# Patient Record
Sex: MALE | Race: WHITE | NOT HISPANIC OR LATINO | URBAN - METROPOLITAN AREA
[De-identification: names, ages, dates, MRNs, and addresses within clinical notes are randomized per-mention and may not be internally consistent; named-entity substitution may affect disease eponyms.]

---

## 2021-02-24 RX ORDER — LAMOTRIGINE 200 MG/1
200 TABLET ORAL DAILY
COMMUNITY

## 2021-02-24 RX ORDER — ATORVASTATIN CALCIUM 40 MG/1
40 TABLET, FILM COATED ORAL EVERY MORNING
COMMUNITY

## 2021-02-24 RX ORDER — BUPROPION HYDROCHLORIDE 100 MG/1
100 TABLET, EXTENDED RELEASE ORAL 2 TIMES DAILY
Status: ON HOLD | COMMUNITY
End: 2021-03-02 | Stop reason: ENTERED-IN-ERROR

## 2021-02-24 RX ORDER — QUETIAPINE FUMARATE 100 MG/1
100 TABLET, FILM COATED ORAL 2 TIMES DAILY
COMMUNITY
End: 2021-03-18 | Stop reason: ENTERED-IN-ERROR

## 2021-02-24 RX ORDER — PANTOPRAZOLE SODIUM 40 MG/1
40 TABLET, DELAYED RELEASE ORAL EVERY MORNING
COMMUNITY

## 2021-02-24 RX ORDER — CLONAZEPAM 0.5 MG/1
0.5 TABLET ORAL 2 TIMES WEEKLY
COMMUNITY

## 2021-02-24 RX ORDER — EMTRICITABINE AND TENOFOVIR ALAFENAMIDE 200; 25 MG/1; MG/1
1 TABLET ORAL EVERY MORNING
COMMUNITY

## 2021-02-24 RX ORDER — MORPHINE SULFATE 15 MG/1
15 TABLET ORAL 2 TIMES DAILY PRN
COMMUNITY
End: 2021-03-04 | Stop reason: HOSPADM

## 2021-02-24 SDOH — HEALTH STABILITY: MENTAL HEALTH: HOW OFTEN DO YOU HAVE A DRINK CONTAINING ALCOHOL?: 2-4 TIMES A MONTH

## 2021-02-24 ASSESSMENT — PAIN SCALES - GENERAL: PAINLEVEL: 6

## 2021-02-25 ENCOUNTER — TRANSCRIBE ORDERS (OUTPATIENT)
Dept: REGISTRATION | Facility: HOSPITAL | Age: 46
End: 2021-02-25

## 2021-02-25 ENCOUNTER — OFFICE VISIT (OUTPATIENT)
Dept: PREADMISSION TESTING | Facility: HOSPITAL | Age: 46
End: 2021-02-25
Attending: ORTHOPAEDIC SURGERY
Payer: COMMERCIAL

## 2021-02-25 ENCOUNTER — OFFICE VISIT (OUTPATIENT)
Dept: CARDIOLOGY | Facility: CLINIC | Age: 46
End: 2021-02-25
Payer: COMMERCIAL

## 2021-02-25 ENCOUNTER — APPOINTMENT (OUTPATIENT)
Dept: LAB | Facility: HOSPITAL | Age: 46
End: 2021-02-25
Attending: ORTHOPAEDIC SURGERY
Payer: COMMERCIAL

## 2021-02-25 VITALS
BODY MASS INDEX: 30.1 KG/M2 | OXYGEN SATURATION: 99 % | WEIGHT: 215 LBS | HEIGHT: 71 IN | HEART RATE: 85 BPM | SYSTOLIC BLOOD PRESSURE: 126 MMHG | DIASTOLIC BLOOD PRESSURE: 78 MMHG

## 2021-02-25 VITALS
OXYGEN SATURATION: 99 % | BODY MASS INDEX: 29.89 KG/M2 | WEIGHT: 213.5 LBS | SYSTOLIC BLOOD PRESSURE: 128 MMHG | RESPIRATION RATE: 18 BRPM | DIASTOLIC BLOOD PRESSURE: 81 MMHG | TEMPERATURE: 98.3 F | HEART RATE: 96 BPM | HEIGHT: 71 IN

## 2021-02-25 DIAGNOSIS — Z01.818 PREOP TESTING: Primary | ICD-10-CM

## 2021-02-25 DIAGNOSIS — G47.33 OSA (OBSTRUCTIVE SLEEP APNEA): ICD-10-CM

## 2021-02-25 DIAGNOSIS — Z71.89 COUNSELING ON HEALTH PROMOTION AND DISEASE PREVENTION: ICD-10-CM

## 2021-02-25 DIAGNOSIS — F31.9 BIPOLAR 1 DISORDER (CMS/HCC): ICD-10-CM

## 2021-02-25 DIAGNOSIS — I82.4Z9 DEEP VEIN THROMBOSIS (DVT) OF DISTAL VEIN OF LOWER EXTREMITY, UNSPECIFIED CHRONICITY, UNSPECIFIED LATERALITY (CMS/HCC): ICD-10-CM

## 2021-02-25 DIAGNOSIS — Z21 ASYMPTOMATIC HIV INFECTION (CMS/HCC): ICD-10-CM

## 2021-02-25 DIAGNOSIS — Z01.810 ENCOUNTER FOR PRE-OPERATIVE CARDIOVASCULAR CLEARANCE: ICD-10-CM

## 2021-02-25 DIAGNOSIS — K86.1 CHRONIC PANCREATITIS, UNSPECIFIED PANCREATITIS TYPE (CMS/HCC): ICD-10-CM

## 2021-02-25 DIAGNOSIS — M16.12 OSTEOARTHRITIS OF LEFT HIP, UNSPECIFIED OSTEOARTHRITIS TYPE: ICD-10-CM

## 2021-02-25 DIAGNOSIS — M16.12 UNILATERAL PRIMARY OSTEOARTHRITIS, LEFT HIP: ICD-10-CM

## 2021-02-25 DIAGNOSIS — I48.91 ATRIAL FIBRILLATION, UNSPECIFIED TYPE (CMS/HCC): Primary | ICD-10-CM

## 2021-02-25 DIAGNOSIS — M16.12 UNILATERAL PRIMARY OSTEOARTHRITIS, LEFT HIP: Primary | ICD-10-CM

## 2021-02-25 PROBLEM — M19.90 DJD (DEGENERATIVE JOINT DISEASE): Status: ACTIVE | Noted: 2021-02-25

## 2021-02-25 PROBLEM — I82.409 DVT (DEEP VENOUS THROMBOSIS) (CMS/HCC): Status: ACTIVE | Noted: 2021-02-25

## 2021-02-25 LAB
ABO + RH BLD: NORMAL
ANION GAP SERPL CALC-SCNC: 10 MEQ/L (ref 3–15)
APTT PPP: 28 SEC (ref 23–35)
BLD GP AB SCN SERPL QL: NEGATIVE
BUN SERPL-MCNC: 15 MG/DL (ref 8–20)
CALCIUM SERPL-MCNC: 9.5 MG/DL (ref 8.9–10.3)
CHLORIDE SERPL-SCNC: 103 MEQ/L (ref 98–109)
CO2 SERPL-SCNC: 26 MEQ/L (ref 22–32)
CREAT SERPL-MCNC: 1.1 MG/DL (ref 0.8–1.3)
D AG BLD QL: POSITIVE
ERYTHROCYTE [DISTWIDTH] IN BLOOD BY AUTOMATED COUNT: 14 % (ref 11.6–14.4)
GFR SERPL CREATININE-BSD FRML MDRD: >60 ML/MIN/1.73M*2
GLUCOSE SERPL-MCNC: 93 MG/DL (ref 70–99)
HCT VFR BLDCO AUTO: 43.7 % (ref 40.1–51)
HGB BLD-MCNC: 14.1 G/DL (ref 13.7–17.5)
INR PPP: 1.2
LABORATORY COMMENT REPORT: NORMAL
MCH RBC QN AUTO: 30.4 PG (ref 28–33.2)
MCHC RBC AUTO-ENTMCNC: 32.3 G/DL (ref 32.2–36.5)
MCV RBC AUTO: 94.2 FL (ref 83–98)
PDW BLD AUTO: 9.5 FL (ref 9.4–12.4)
PLATELET # BLD AUTO: 241 K/UL (ref 150–350)
POTASSIUM SERPL-SCNC: 3.9 MEQ/L (ref 3.6–5.1)
PROTHROMBIN TIME: 15 SEC (ref 12.2–14.5)
RBC # BLD AUTO: 4.64 M/UL (ref 4.5–5.8)
SODIUM SERPL-SCNC: 139 MEQ/L (ref 136–144)
WBC # BLD AUTO: 6.89 K/UL (ref 3.8–10.5)

## 2021-02-25 PROCEDURE — 99244 OFF/OP CNSLTJ NEW/EST MOD 40: CPT | Performed by: INTERNAL MEDICINE

## 2021-02-25 PROCEDURE — U0003 INFECTIOUS AGENT DETECTION BY NUCLEIC ACID (DNA OR RNA); SEVERE ACUTE RESPIRATORY SYNDROME CORONAVIRUS 2 (SARS-COV-2) (CORONAVIRUS DISEASE [COVID-19]), AMPLIFIED PROBE TECHNIQUE, MAKING USE OF HIGH THROUGHPUT TECHNOLOGIES AS DESCRIBED BY CMS-2020-01-R: HCPCS | Performed by: HOSPITALIST

## 2021-02-25 PROCEDURE — 36415 COLL VENOUS BLD VENIPUNCTURE: CPT | Performed by: HOSPITALIST

## 2021-02-25 PROCEDURE — 85730 THROMBOPLASTIN TIME PARTIAL: CPT | Mod: GA

## 2021-02-25 PROCEDURE — 85027 COMPLETE CBC AUTOMATED: CPT | Mod: GA

## 2021-02-25 PROCEDURE — 85610 PROTHROMBIN TIME: CPT | Mod: GA

## 2021-02-25 PROCEDURE — 86900 BLOOD TYPING SEROLOGIC ABO: CPT

## 2021-02-25 PROCEDURE — 99204 OFFICE O/P NEW MOD 45 MIN: CPT | Performed by: HOSPITALIST

## 2021-02-25 PROCEDURE — 80048 BASIC METABOLIC PNL TOTAL CA: CPT

## 2021-02-25 PROCEDURE — 93000 ELECTROCARDIOGRAM COMPLETE: CPT | Performed by: INTERNAL MEDICINE

## 2021-02-25 RX ORDER — FUROSEMIDE 40 MG/1
40 TABLET ORAL DAILY PRN
COMMUNITY
End: 2021-02-25 | Stop reason: ENTERED-IN-ERROR

## 2021-02-25 ASSESSMENT — ENCOUNTER SYMPTOMS
HEMATOCHEZIA: 0
DYSPNEA ON EXERTION: 0
IRREGULAR HEARTBEAT: 0
SYNCOPE: 0
LIGHT-HEADEDNESS: 0
NEAR-SYNCOPE: 0
BRUISES/BLEEDS EASILY: 1
PND: 0
PALPITATIONS: 0
HEMATEMESIS: 0
DIZZINESS: 0
DIAPHORESIS: 0
HEMATURIA: 0
SHORTNESS OF BREATH: 0
MYALGIAS: 0
ORTHOPNEA: 0
ALTERED MENTAL STATUS: 0

## 2021-02-25 ASSESSMENT — PAIN SCALES - GENERAL: PAINLEVEL: 5

## 2021-02-25 NOTE — ASSESSMENT & PLAN NOTE
There is no evidence of any active or ongoing cardiac condition at this time.  The proposed procedure in general carries an intermediate risk of cardiac complications.  The patient has intermediate clinical predictors of increased risk of periprocedural cardiac complications. There have been no recent episodes of angina, CHF, or clinical arrhythmias.  Patient is at acceptable risk for surgery from a cardiac standpoint.  Further testing or interventions are unlikely to improve the patient's risk. Routine perioperative care. His hematologist recommended to hold Eliquis for 3 days before surgery.  I discussed with patient and decided to hold it only for 2 days to minimize the time that he is not anticoagulated.  We will resume the routine medications when oral intake is tolerated post-operatively. DVT prophylaxis according to the surgical service's protocol. We will assist you with the management of any medical problems during the hospitalization.

## 2021-02-25 NOTE — PROGRESS NOTES
Pre-Operative   Consult Note         Reason for visit:   Chief Complaint   Patient presents with   • New Patient       HPI     Sam Fitzgerald Chevalier comes to the office today for preoperative cardiac evaluation prior to a left hip replacement with Dr. Pradeep Rick on 3/2/21.    He reports ongoing left hip pain that has progressively worsened, limiting his activity. His PMH is significant for DVT, Anemia, HIV and HARPER, He does not use CPAP since a weight loss of 150 lbs. His primary care is managed by Dr. Jenae Beck.    Today he reports feeling well, other than hip pain since 2017, but he is going to the gym a few days a week doing the ellipical for 30 minutes and lifting weights without cardiac complaints or limitations.     He reports that he can climb a flight of stairs and walk at least a city block in distance without chest discomfort or shortness of breath.    Patient denies any history of cardiac disease. Patient denies any chest pain, shortness of breath, dizziness or palpitations. Patient denies any PND or orthopnea, he can sleep almost flat at night without any cardiac symptoms.      Past Medical History:   Diagnosis Date   • Anemia     last transfusion    • Bipolar 1 disorder (CMS/HCC)    • Deep vein thrombosis (CMS/HCC)     last clot  PE   • Depression    • HIV disease (CMS/[facility])    • Pancreatitis    • Sleep apnea     lost 150lb  no cpap currently       Past Surgical History:   Procedure Laterality Date   • ABDOMINOPLASTY     • DENTAL SURGERY      extractions     • GASTRIC BYPASS         Social History     Tobacco Use   Smoking Status Former Smoker   • Packs/day: 1.00   • Years: 25.00   • Pack years: 25.00   • Quit date: 2021   • Years since quittin.1     Social History     Tobacco Use   • Smoking status: Former Smoker     Packs/day: 1.00     Years: 25.00     Pack years: 25.00     Quit date: 2021     Years since quittin.1   • Smokeless tobacco: Never  Used   Substance Use Topics   • Alcohol use: Yes     Frequency: 2-4 times a month   • Drug use: Not Currently     Types: Heroin, Codeine, IV     Comment: no longer uses heroin or cocaine         Family History   Adopted: Yes       Allergies:  Patient has no known allergies.    Current Outpatient Medications   Medication Sig Dispense Refill   • apixaban (ELIQUIS) 2.5 mg tablet Take 2.5 mg by mouth 2 (two) times a day.     • atorvastatin (LIPITOR) 40 mg tablet Take 40 mg by mouth every morning.     • buPROPion SR (WELLBUTRIN SR) 100 mg 12 hr tablet Take 100 mg by mouth 2 (two) times a day. 100mg in morning  75mg afternoon     • clonazePAM (klonoPIN) 0.5 mg tablet Take 0.5 mg by mouth 2 (two) times a week (Sun, Wed).     • dolutegravir (TIVICAY) 50 mg tablet Take 50 mg by mouth every morning.     • emtricitabine-tenofovir alafenamide (DESCOVY) 200-25 mg tablet tablet Take 1 tablet by mouth every morning.     • lamoTRIgine (LaMICtal) 25 mg tablet Take 200 mg by mouth daily. 1 tablet daily      • morphine (MSIR) 15 mg immediate release tablet Take 15 mg by mouth daily as needed.       • pantoprazole (PROTONIX) 40 mg EC tablet Take 40 mg by mouth every morning.     • QUEtiapine (SEROquel) 100 mg tablet Take 100 mg by mouth nightly.     • medical marijuana 1 each See admin instr. Please be advised that an [facility] hospital staff member has listed medical marijuana as one of your home medications for documentation purposes. Please follow-up with your certified issuing provider for ongoing use of medical marijuana.       No current facility-administered medications for this visit.        Review of Systems   Constitution: Negative for diaphoresis.   HENT: Negative for hearing loss.    Eyes: Negative for visual disturbance.   Cardiovascular: Positive for leg swelling. Negative for chest pain, dyspnea on exertion, irregular heartbeat, near-syncope, orthopnea, palpitations, paroxysmal nocturnal dyspnea and syncope.   Respiratory:  Negative for shortness of breath.    Hematologic/Lymphatic: Bruises/bleeds easily.   Skin: Negative for rash.   Musculoskeletal: Positive for arthritis and joint pain (left hip). Negative for myalgias.   Gastrointestinal: Negative for hematemesis, hematochezia and melena.   Genitourinary: Negative for hematuria.   Neurological: Negative for dizziness and light-headedness.   Psychiatric/Behavioral: Negative for altered mental status.       Objective     Vitals:    02/25/21 1231   BP:    Pulse: 85   SpO2:        Wt Readings from Last 1 Encounters:   02/25/21 96.8 kg (213 lb 8 oz)       Physical Exam   Constitutional: He appears well-developed.   HENT:   Head: Normocephalic and atraumatic.   Eyes: EOM are normal.   Neck: No JVD present.   Cardiovascular: Regular rhythm, S1 normal, S2 normal and intact distal pulses. Tachycardia present.   No murmur heard.  Pulses:       Carotid pulses are 2+ on the right side and 2+ on the left side.       Posterior tibial pulses are 2+ on the right side and 2+ on the left side.   Pulmonary/Chest: Effort normal and breath sounds normal.   Abdominal: Soft. Bowel sounds are normal.   Musculoskeletal:         General: No edema.   Neurological: He is alert.   Skin: Skin is warm.   Psychiatric: He has a normal mood and affect.   Vitals reviewed.    ECG   Sinus  Tachycardia   -  Nonspecific T-abnormality.     ABNORMAL     Labs   Lab Results   Component Value Date    WBC 6.89 02/25/2021    HGB 14.1 02/25/2021    HCT 43.7 02/25/2021     02/25/2021     02/25/2021    K 3.9 02/25/2021     02/25/2021    CREATININE 1.1 02/25/2021    BUN 15 02/25/2021    CO2 26 02/25/2021    PT 15.0 (H) 02/25/2021    GLUCOSE 93 02/25/2021         Surgical Risk Assessment  The proposed procedure is intermediate risk, corresponding to a 1-5% 30-day risk of a major adverse cardiac event.    The patient has the following risk factors from the Revised Cardiac Risk Index (RCRI): None.  With no risk  factors, this corresponds to a low risk (0.4%) of major adverse cardiac events.    The patient has a Duke Activity Status Index score of 34.7, corresponding to an expected exertional capacity of 7.01 METS.          Assessment/Plan     HARPER (obstructive sleep apnea)  Patient reports that he was diagnosed with sleep apnea but then he lost significant amount of weight and a repeat sleep study was negative.  To consider CO2 monitoring perioperatively.    Encounter for pre-operative cardiovascular clearance  There is no evidence of any active or ongoing cardiac condition at this time.  The proposed procedure in general carries an intermediate risk of cardiac complications.  The patient has intermediate clinical predictors of increased risk of periprocedural cardiac complications. There have been no recent episodes of angina, CHF, or clinical arrhythmias.  Patient is at acceptable risk for surgery from a cardiac standpoint.  Further testing or interventions are unlikely to improve the patient's risk. Routine perioperative care. His hematologist recommended to hold Eliquis for 3 days before surgery.  I discussed with patient and decided to hold it only for 2 days to minimize the time that he is not anticoagulated.  We will resume the routine medications when oral intake is tolerated post-operatively. DVT prophylaxis according to the surgical service's protocol. We will assist you with the management of any medical problems during the hospitalization.       DVT (deep venous thrombosis) (CMS/[facility])  He reports multiple DVTs but tells me that no DVT in the last 5 years.  He understands the high risk of thrombotic events with holding Eliquis for surgery and agrees.  He was seen by his hematologist recently and his hematologist is aware of him having this surgery.    Counseling on health promotion and disease prevention  We discussed about heart disease and cardiac risk factors.  I discussed with patient that HIV is a cardiac risk  enhancement factor.  Patient was extensively counseled regarding lifestyle changes and risk factor modifications. Patient understands that noncompliance with these recommendations may increase the risk of cardiovascular complications.  Follow-up with primary care physician to continue preventive measures for coronary artery disease, risk factor modification and lifestyle changes.  Recommend follow a cardiac diet and exercise as tolerated.       This note was completed in part utilizing a voice recognition software. Grammatical errors, random word insertion, spelling mistakes, and incomplete sentences may be an occasional consequence of the system secondary to software limitations, ambient noise and hardware issues.   Please read the chart carefully and recognize, using context, where substitutions have occurred. If you have any questions or concerns about the context, text or information contained within the body of this dictation, please contact myself, the provider, for further clarification.    I, Kristy Greenwood, am scribing for, and in the presence of, Clinton Schmidt MD.    IClinton MD, personally performed the services described in this documentation as scribed by Kristy Greenwood in my presence, and it is both accurate and complete.       Clinton Schmidt MD  2/25/2021

## 2021-02-25 NOTE — CONSULTS
[facility] Medicine Service -  Pre-Operative Consultation       Patient Name: Sam Fitzgerald Chevalier  Referring Surgeon: Dr. Rick    Reason for Referral: Pre-Operative Evaluation  Surgical Procedure: L THR  Operative Date: 3/2/21    PCP: Jenae Beck MD        HISTORY OF PRESENT ILLNESS      Sam Fitzgerald Chevalier is a 45 y.o. male presenting today to the [facility] at [place] for pre-operative evaluation prior to planned surgery.    Mr. Chevalier has been dealing with pain and decreased ROM of his left hip due to DJD. The symptoms have progressed and are interfering with his quality of life so he has decided to proceed with joint replacement.    Overall, the patient has been feeling in good health with the exception of his hip pain. He denies current or recent CP, SOB, palpitations, syncope, fever, cough, abd pain, vomiting, diarrhea, BRBPR, melena, hematemesis, dysuria, hematuria, headache, vision change, vertigo, numbness/tingling/focal weakness, light headedness, or additional complaint.       Functionally, the patient is able to ascend a flight of stairs with no dyspnea or chest pain. He works out at the gym several days per week.  He denies ever having any anginal symptoms.  Functional capacity is  > 4 METS.    The patient denies, on specific questioning, the following:  No history of MI, arrhythmia, valvular heart disease or CHF.  No history of COPD.  No history of CVA or TIA.  No history of DM or insulin use.   No history of CKD.   No history of liver disease or cirrhosis.  No history of bleeding disorder.  No history of difficult airway/difficult intubation.  No history of Malignant hyperthermia.  No history of adverse reaction to anesthesia in the past.      PAST MEDICAL AND SURGICAL HISTORY      Past Medical History:   Diagnosis Date   • Anemia     last transfusion 2017   • Bipolar 1 disorder (CMS/HCC)    • Deep vein thrombosis (CMS/HCC) 2009     last clot 2015 PE   • Depression    • HIV disease (CMS/[facility])    • Pancreatitis    • Sleep apnea 2004    lost 150lb  no cpap currently       Past Surgical History:   Procedure Laterality Date   • ABDOMINOPLASTY  2006   • DENTAL SURGERY  2020    extractions     • GASTRIC BYPASS  2004       MEDICATIONS        Current Outpatient Medications:   •  apixaban (ELIQUIS) 2.5 mg tablet, Take 2.5 mg by mouth 2 (two) times a day., Disp: , Rfl:   •  atorvastatin (LIPITOR) 40 mg tablet, Take 40 mg by mouth every morning., Disp: , Rfl:   •  buPROPion SR (WELLBUTRIN SR) 100 mg 12 hr tablet, Take 100 mg by mouth 2 (two) times a day. 100mg in morning 75mg afternoon, Disp: , Rfl:   •  clonazePAM (klonoPIN) 0.5 mg tablet, Take 0.5 mg by mouth 2 (two) times a week (Sun, Wed)., Disp: , Rfl:   •  dolutegravir (TIVICAY) 50 mg tablet, Take 50 mg by mouth every morning., Disp: , Rfl:   •  emtricitabine-tenofovir alafenamide (DESCOVY) 200-25 mg tablet tablet, Take 1 tablet by mouth every morning., Disp: , Rfl:   •  lamoTRIgine (LaMICtal) 25 mg tablet, Take 200 mg by mouth daily. 1 tablet daily , Disp: , Rfl:   •  medical marijuana, 1 each See admin instr. Please be advised that an [facility] hospital staff member has listed medical marijuana as one of your home medications for documentation purposes. Please follow-up with your certified issuing provider for ongoing use of medical marijuana., Disp: , Rfl:   •  morphine (MSIR) 15 mg immediate release tablet, Take 15 mg by mouth daily as needed.  , Disp: , Rfl:   •  pantoprazole (PROTONIX) 40 mg EC tablet, Take 40 mg by mouth every morning., Disp: , Rfl:   •  QUEtiapine (SEROquel) 100 mg tablet, Take 100 mg by mouth nightly., Disp: , Rfl:     ALLERGIES      Patient has no known allergies.    FAMILY HISTORY      family history is not on file. He was adopted.    SOCIAL HISTORY      Social History     Tobacco Use   • Smoking status: Former Smoker     Packs/day: 1.00     Years: 25.00     Pack years: 25.00     " Quit date: 2021     Years since quittin.1   • Smokeless tobacco: Never Used   Substance Use Topics   • Alcohol use: Yes     Frequency: 2-4 times a month   • Drug use: Not Currently     Types: Heroin, Codeine, IV     Comment: no longer uses heroin or cocaine         REVIEW OF SYSTEMS      All other systems reviewed and negative except as noted in HPI    PHYSICAL EXAMINATION      Visit Vitals  /81   Pulse 96   Temp 36.8 °C (98.3 °F) (Oral)   Resp 18   Ht 1.803 m (5' 11\")   Wt 96.8 kg (213 lb 8 oz)   SpO2 99%   BMI 29.78 kg/m²     Body mass index is 29.78 kg/m².    Physical Exam  General: nontoxic appearing, no acute distress  HEENT: Moist membranes, PERRL, anicteric sclera   Neck: supple, no JVD  Cardiac: RRR, +S1/S2, no murmur, no rub   Lungs: clear bilaterally, no wheezing/rales/rhonchi  Abdomen: soft, NT/ND, +BS, no rebound/guarding   Extremities: no edema, distal perfusion intact  MSK: no joint effusion or erythema  Neuro: AAOx3, nonfocal. CN's intact grossly. No focal motor deficit. No sensory deficit.  Skin: no rash. Clean, dry, intact.   Psych: cooperative    LABS / EKG        Labs  Today's labs reviewed.    Lab Results   Component Value Date     2021    K 3.9 2021     2021    BUN 15 2021    CREATININE 1.1 2021    WBC 6.89 2021    HGB 14.1 2021    HCT 43.7 2021     2021    INR 1.2 2021         ECG   Reviewed. Sinus 102. Nonspecific abnormality.    ASSESSMENT AND PLAN         DJD (degenerative joint disease)  L THR planned for 3/2/21.  See below for statement in regards to perioperative risk.   Defer recommendations on NSAID use to the patient's surgeon.  Recommend DVT prophylaxis at the discretion of the surgeon.   Incentive spirometry and early ambulation post op.  Perioperative antibiotics defer to surgery.  Post op bowel regimen.  If present, recommend removal of trevizo catheter as early as possible to prevent " infection.  Monitor CBC, BMP, and volume status in the perioperative period.      HARPER (obstructive sleep apnea)  The patient states he has not required CPAP since he lost significant weight.  Sleep apnea places the patient at relatively increased risk (compared to a population without this diagnosis) of oxygen desaturation, cardiac events, acute respiratory failure, or an ICU transfer.   In the post op period: recommend use of our sleep apnea protocol in the PACU, pulse ox monitoring, consider extubate to BIPAP.  Reduce narcotic medication dosage as much as possible.       DVT (deep venous thrombosis) (CMS/[facility])  History of DVT/PE in 2015.  On Eliquis.  The patient has not had a recurrent thrombosis since 2015.  He discussed his plan for surgery with his hematologist at [facility], and he plans on stopping Eliquis 2 days prior to surgery without Lovenox bridge. Risks/benefits of Lovenox bridge discussed with the patient and he prefers to just hold Eliquis without a bridge.  He is aware of the increased risk of thrombosis while his anticoagulation is held for surgery.  Recommend resuming Eliquis post op as soon as ok with the surgeon.    Bipolar 1 disorder (CMS/HCC)  The patient states this is controlled on his current regimen.  Continue home med regimen post op    Chronic pancreatitis (CMS/[facility])  History of chronic pain.   He takes MSIR once per day prn pain with eating.  Follows with pain management at [facility].  Multimodal post op pain management per the patient's inpatient medical team.   Post op bowel regimen.     Asymptomatic HIV infection (CMS/HCC)  CD4 958 and undetectable viral load on last month's lab work.  The patient will bring his Tivicay and Descovy with him for his upcoming hospitalization as these are likely not on formulary with the pharmacy.   Resume his HIV regimen post op when tolerating po intake.        In regards to perioperative cardiac risk:  The patient denies any history of ischemic heart  disease, denies any history of CHF, denies any history of CVA, is not on pre-operative treatment with insulin, and does not have a pre-operative creatinine > 2 mg/dL.   The Revised Cardiac Risk Index (RCRI) = 0 for this patient which indicates a 0.4% risk of major adverse cardiac event in the perioperative period for this intermediate risk procedure.   The patient has an appointment to see [facility] for preoperative assessment later today. Ultimately defer preoperative cardiovascular risk assessment and recommendations for further testing to the patient's cardiologist.            Please do not hesitate to contact [facility] during the upcoming hospitalization with any questions or concerns.     Grupo Coats, DO  2/26/2021

## 2021-02-25 NOTE — ASSESSMENT & PLAN NOTE
L THR planned for 3/2/21.  See below for statement in regards to perioperative risk.   Defer recommendations on NSAID use to the patient's surgeon.  Recommend DVT prophylaxis at the discretion of the surgeon.   Incentive spirometry and early ambulation post op.  Perioperative antibiotics defer to surgery.  Post op bowel regimen.  If present, recommend removal of trevizo catheter as early as possible to prevent infection.  Monitor CBC, BMP, and volume status in the perioperative period.

## 2021-02-25 NOTE — ASSESSMENT & PLAN NOTE
The patient states he has not required CPAP since he lost significant weight.  Sleep apnea places the patient at relatively increased risk (compared to a population without this diagnosis) of oxygen desaturation, cardiac events, acute respiratory failure, or an ICU transfer.   In the post op period: recommend use of our sleep apnea protocol in the PACU, pulse ox monitoring, consider extubate to BIPAP.  Reduce narcotic medication dosage as much as possible.

## 2021-02-25 NOTE — ASSESSMENT & PLAN NOTE
History of chronic pain.   He takes MSIR once per day prn pain with eating.  Follows with pain management at [facility].  Multimodal post op pain management per the patient's inpatient medical team.   Post op bowel regimen.

## 2021-02-25 NOTE — ASSESSMENT & PLAN NOTE
CD4 958 and undetectable viral load on last month's lab work.  The patient will bring his Tivicay and Descovy with him for his upcoming hospitalization as these are likely not on formulary with the pharmacy.   Resume his HIV regimen post op when tolerating po intake.

## 2021-02-25 NOTE — ASSESSMENT & PLAN NOTE
History of DVT/PE in 2015.  On Eliquis.  The patient has not had a recurrent thrombosis since 2015.  He discussed his plan for surgery with his hematologist at [facility], and he plans on stopping Eliquis 2 days prior to surgery without Lovenox bridge. Risks/benefits of Lovenox bridge discussed with the patient and he prefers to just hold Eliquis without a bridge.  He is aware of the increased risk of thrombosis while his anticoagulation is held for surgery.  Recommend resuming Eliquis post op as soon as ok with the surgeon.

## 2021-02-25 NOTE — ASSESSMENT & PLAN NOTE
We discussed about heart disease and cardiac risk factors.  I discussed with patient that HIV is a cardiac risk enhancement factor.  Patient was extensively counseled regarding lifestyle changes and risk factor modifications. Patient understands that noncompliance with these recommendations may increase the risk of cardiovascular complications.  Follow-up with primary care physician to continue preventive measures for coronary artery disease, risk factor modification and lifestyle changes.  Recommend follow a cardiac diet and exercise as tolerated.

## 2021-02-25 NOTE — ASSESSMENT & PLAN NOTE
Patient reports that he was diagnosed with sleep apnea but then he lost significant amount of weight and a repeat sleep study was negative.  To consider CO2 monitoring perioperatively.

## 2021-02-25 NOTE — H&P (VIEW-ONLY)
[facility] Medicine Service -  Pre-Operative Consultation       Patient Name: Sam Fitzgerald Chevalier  Referring Surgeon: Dr. Rick    Reason for Referral: Pre-Operative Evaluation  Surgical Procedure: L THR  Operative Date: 3/2/21    PCP: Jenae Beck MD        HISTORY OF PRESENT ILLNESS      Sam Fitzgerald Chevalier is a 45 y.o. male presenting today to the [facility] [place] for pre-operative evaluation prior to planned surgery.    Mr. Chevalier has been dealing with pain and decreased ROM of his left hip due to DJD. The symptoms have progressed and are interfering with his quality of life so he has decided to proceed with joint replacement.    Overall, the patient has been feeling in good health with the exception of his hip pain. He denies current or recent CP, SOB, palpitations, syncope, fever, cough, abd pain, vomiting, diarrhea, BRBPR, melena, hematemesis, dysuria, hematuria, headache, vision change, vertigo, numbness/tingling/focal weakness, light headedness, or additional complaint.       Functionally, the patient is able to ascend a flight of stairs with no dyspnea or chest pain. He works out at the gym several days per week.  He denies ever having any anginal symptoms.  Functional capacity is  > 4 METS.    The patient denies, on specific questioning, the following:  No history of MI, arrhythmia, valvular heart disease or CHF.  No history of COPD.  No history of CVA or TIA.  No history of DM or insulin use.   No history of CKD.   No history of liver disease or cirrhosis.  No history of bleeding disorder.  No history of difficult airway/difficult intubation.  No history of Malignant hyperthermia.  No history of adverse reaction to anesthesia in the past.      PAST MEDICAL AND SURGICAL HISTORY      Past Medical History:   Diagnosis Date   • Anemia     last transfusion 2017   • Bipolar 1 disorder (CMS/HCC)    • Deep vein thrombosis (CMS/HCC) 2009     last clot 2015 PE   • Depression    • HIV disease (CMS/formerly [facility])    • Pancreatitis    • Sleep apnea 2004    lost 150lb  no cpap currently       Past Surgical History:   Procedure Laterality Date   • ABDOMINOPLASTY  2006   • DENTAL SURGERY  2020    extractions     • GASTRIC BYPASS  2004       MEDICATIONS        Current Outpatient Medications:   •  apixaban (ELIQUIS) 2.5 mg tablet, Take 2.5 mg by mouth 2 (two) times a day., Disp: , Rfl:   •  atorvastatin (LIPITOR) 40 mg tablet, Take 40 mg by mouth every morning., Disp: , Rfl:   •  buPROPion SR (WELLBUTRIN SR) 100 mg 12 hr tablet, Take 100 mg by mouth 2 (two) times a day. 100mg in morning 75mg afternoon, Disp: , Rfl:   •  clonazePAM (klonoPIN) 0.5 mg tablet, Take 0.5 mg by mouth 2 (two) times a week (Sun, Wed)., Disp: , Rfl:   •  dolutegravir (TIVICAY) 50 mg tablet, Take 50 mg by mouth every morning., Disp: , Rfl:   •  emtricitabine-tenofovir alafenamide (DESCOVY) 200-25 mg tablet tablet, Take 1 tablet by mouth every morning., Disp: , Rfl:   •  lamoTRIgine (LaMICtal) 25 mg tablet, Take 200 mg by mouth daily. 1 tablet daily , Disp: , Rfl:   •  medical marijuana, 1 each See admin instr. Please be advised that an [facility] hospital staff member has listed medical marijuana as one of your home medications for documentation purposes. Please follow-up with your certified issuing provider for ongoing use of medical marijuana., Disp: , Rfl:   •  morphine (MSIR) 15 mg immediate release tablet, Take 15 mg by mouth daily as needed.  , Disp: , Rfl:   •  pantoprazole (PROTONIX) 40 mg EC tablet, Take 40 mg by mouth every morning., Disp: , Rfl:   •  QUEtiapine (SEROquel) 100 mg tablet, Take 100 mg by mouth nightly., Disp: , Rfl:     ALLERGIES      Patient has no known allergies.    FAMILY HISTORY      family history is not on file. He was adopted.    SOCIAL HISTORY      Social History     Tobacco Use   • Smoking status: Former Smoker     Packs/day: 1.00     Years: 25.00     Pack years: 25.00     " Quit date: 2021     Years since quittin.1   • Smokeless tobacco: Never Used   Substance Use Topics   • Alcohol use: Yes     Frequency: 2-4 times a month   • Drug use: Not Currently     Types: Heroin, Codeine, IV     Comment: no longer uses heroin or cocaine         REVIEW OF SYSTEMS      All other systems reviewed and negative except as noted in HPI    PHYSICAL EXAMINATION      Visit Vitals  /81   Pulse 96   Temp 36.8 °C (98.3 °F) (Oral)   Resp 18   Ht 1.803 m (5' 11\")   Wt 96.8 kg (213 lb 8 oz)   SpO2 99%   BMI 29.78 kg/m²     Body mass index is 29.78 kg/m².    Physical Exam  General: nontoxic appearing, no acute distress  HEENT: Moist membranes, PERRL, anicteric sclera   Neck: supple, no JVD  Cardiac: RRR, +S1/S2, no murmur, no rub   Lungs: clear bilaterally, no wheezing/rales/rhonchi  Abdomen: soft, NT/ND, +BS, no rebound/guarding   Extremities: no edema, distal perfusion intact  MSK: no joint effusion or erythema  Neuro: AAOx3, nonfocal. CN's intact grossly. No focal motor deficit. No sensory deficit.  Skin: no rash. Clean, dry, intact.   Psych: cooperative    LABS / EKG        Labs  Today's labs reviewed.    Lab Results   Component Value Date     2021    K 3.9 2021     2021    BUN 15 2021    CREATININE 1.1 2021    WBC 6.89 2021    HGB 14.1 2021    HCT 43.7 2021     2021    INR 1.2 2021         ECG   Reviewed. Sinus 102. Nonspecific abnormality.    ASSESSMENT AND PLAN         DJD (degenerative joint disease)  L THR planned for 3/2/21.  See below for statement in regards to perioperative risk.   Defer recommendations on NSAID use to the patient's surgeon.  Recommend DVT prophylaxis at the discretion of the surgeon.   Incentive spirometry and early ambulation post op.  Perioperative antibiotics defer to surgery.  Post op bowel regimen.  If present, recommend removal of trevizo catheter as early as possible to prevent " infection.  Monitor CBC, BMP, and volume status in the perioperative period.      HARPER (obstructive sleep apnea)  The patient states he has not required CPAP since he lost significant weight.  Sleep apnea places the patient at relatively increased risk (compared to a population without this diagnosis) of oxygen desaturation, cardiac events, acute respiratory failure, or an ICU transfer.   In the post op period: recommend use of our sleep apnea protocol in the PACU, pulse ox monitoring, consider extubate to BIPAP.  Reduce narcotic medication dosage as much as possible.       DVT (deep venous thrombosis) (CMS/[facility])  History of DVT/PE in 2015.  On Eliquis.  The patient has not had a recurrent thrombosis since 2015.  He discussed his plan for surgery with his hematologist at [facility], and he plans on stopping Eliquis 2 days prior to surgery without Lovenox bridge. Risks/benefits of Lovenox bridge discussed with the patient and he prefers to just hold Eliquis without a bridge.  He is aware of the increased risk of thrombosis while his anticoagulation is held for surgery.  Recommend resuming Eliquis post op as soon as ok with the surgeon.    Bipolar 1 disorder (CMS/HCC)  The patient states this is controlled on his current regimen.  Continue home med regimen post op    Chronic pancreatitis (CMS/[facility])  History of chronic pain.   He takes MSIR once per day prn pain with eating.  Follows with pain management at [facility].  Multimodal post op pain management per the patient's inpatient medical team.   Post op bowel regimen.     Asymptomatic HIV infection (CMS/HCC)  CD4 958 and undetectable viral load on last month's lab work.  The patient will bring his Tivicay and Descovy with him for his upcoming hospitalization as these are likely not on formulary with the pharmacy.   Resume his HIV regimen post op when tolerating po intake.        In regards to perioperative cardiac risk:  The patient denies any history of ischemic heart  disease, denies any history of CHF, denies any history of CVA, is not on pre-operative treatment with insulin, and does not have a pre-operative creatinine > 2 mg/dL.   The Revised Cardiac Risk Index (RCRI) = 0 for this patient which indicates a 0.4% risk of major adverse cardiac event in the perioperative period for this intermediate risk procedure.   The patient has an appointment to see [facility] for preoperative assessment later today. Ultimately defer preoperative cardiovascular risk assessment and recommendations for further testing to the patient's cardiologist.            Please do not hesitate to contact [facility] during the upcoming hospitalization with any questions or concerns.     Grupo Coats, DO  2/26/2021

## 2021-02-25 NOTE — ASSESSMENT & PLAN NOTE
He reports multiple DVTs but tells me that no DVT in the last 5 years.  He understands the high risk of thrombotic events with holding Eliquis for surgery and agrees.  He was seen by his hematologist recently and his hematologist is aware of him having this surgery.

## 2021-02-25 NOTE — PRE-PROCEDURE INSTRUCTIONS
1. You will receive a phone call from the hospital one business day prior to surgery date between 3p-7p.   2. Please report to the hospital as instructed in the phone call.   3. Please follow the following fasting guidelines:   1. No solid food for 8 hours prior to arrival time  2. Unlimited CLEAR liquids meaning WATER and BLACK coffee/tea WITHOUT MILK OR CREAM OR SUGAR are permitted up to 2 hours prior to arrival at the hospital   4. Early on the morning of the procedure please take your usual dose of the listed medications with a sip of water. Protonix  Stop eliquis 2 days prior to surgery  Bring TIVICAY and DESCOVY meds to hospital   Instructions per Dr Coats   5. Other Instructions: body wash as instructed   6. If you develop a cold, cough, fever, rash, or other symptom prior to the data of the procedure, please report it to your physician immediately.   7. If you need to cancel the procedure for any reason, please contact your physician or call the unit listed above.   8. Make arrangements to have someone drive you home from the procedure. If you have not arranged for transportation home, your surgery may be cancelled.    9. You may not take public transportation unless accompanied by a responsible person.   10. You may not drive a car or operate complex or potentially dangerous machinery for 24 hours following anesthesia and/or sedation.   11. If it is medically necessary for you to have a longer stay, you will be informed as soon as the decision is made.   12. Do not wear or being anything of value to the hospital including jewelry of any kind. Do not wear make-up or contact lenses. DO bring your glasses and hearing aid.   13. Dress in comfortable clothes.   14.  If instructed, please bring a copy of your Advanced Directive (Living Will/Durable Power of ) on the day of your procedure.      Pre operative instructions given as per protocol.  Form explained by:      I have read and understand the  above information. I have had sufficient opportunity to ask questions I might have and they have been answered to my satisfaction. I agree to comply with the Patient Responsibilities listed above and have received a copy of this form.

## 2021-02-26 LAB — SARS-COV-2 RNA RESP QL NAA+PROBE: NOT DETECTED

## 2021-02-28 ENCOUNTER — ANESTHESIA EVENT (OUTPATIENT)
Dept: OPERATING ROOM | Facility: HOSPITAL | Age: 46
Setting detail: HOSPITAL OUTPATIENT SURGERY
DRG: 470 | End: 2021-02-28
Payer: COMMERCIAL

## 2021-03-01 NOTE — ANESTHESIA PREPROCEDURE EVALUATION
Anesthesia ROS/MED HX      Pulmonary    Sleep apnea Not CPAP Compliant  Cardiovascular   Cardiac clearance reviewed, Covid19 Test Reviewed and ECG reviewed  Endo/Other  Body Habitus: Overweight       Past Surgical History:   Procedure Laterality Date   • ABDOMINOPLASTY  2006   • DENTAL SURGERY  2020    extractions     • GASTRIC BYPASS  2004     .  Patient Active Problem List   Diagnosis   • DJD (degenerative joint disease)   • Encounter for pre-operative cardiovascular clearance   • HARPER (obstructive sleep apnea)   • DVT (deep venous thrombosis) (CMS/[facility])   • Bipolar 1 disorder (CMS/HCC)   • Chronic pancreatitis (CMS/HCC)   • Asymptomatic HIV infection (CMS/[facility])   • Counseling on health promotion and disease prevention       Current Facility-Administered Medications   Medication Dose Route Frequency   • ceFAZolin  2 g intravenous 60 min Pre-Op   • celecoxib  200 mg oral Once   • dolutegravir  50 mg oral q AM   • emtricitabine-tenofovir alafenamide  1 tablet oral q AM   • lactated ringer's   intravenous Continuous   • midazolam       • povidone-iodine  1 application Each Nostril 60 min Pre-Op   • tranexamic acid  1,000 mg intravenous Once       Prior to Admission medications    Medication Sig Start Date End Date Taking? Authorizing Provider   atorvastatin (LIPITOR) 40 mg tablet Take 40 mg by mouth every morning.   Yes Gerald Paez MD   buPROPion SR (WELLBUTRIN SR) 100 mg 12 hr tablet Take 100 mg by mouth 2 (two) times a day. 100mg in morning  75mg afternoon   Yes Gerald Paez MD   dolutegravir (TIVICAY) 50 mg tablet Take 50 mg by mouth every morning.   Yes Gerald Paez MD   emtricitabine-tenofovir alafenamide (DESCOVY) 200-25 mg tablet tablet Take 1 tablet by mouth every morning.   Yes Gerald Paez MD   lamoTRIgine (LaMICtal) 25 mg tablet Take 200 mg by mouth daily. 1 tablet daily    Yes Gerald Paez MD   medical marijuana 1 each See admin instr. Please be advised that an  [facility] hospital staff member has listed medical marijuana as one of your home medications for documentation purposes. Please follow-up with your certified issuing provider for ongoing use of medical marijuana.   Yes Gerald Paez MD   morphine (MSIR) 15 mg immediate release tablet Take 15 mg by mouth daily as needed.     Yes Gerald Paez MD   pantoprazole (PROTONIX) 40 mg EC tablet Take 40 mg by mouth every morning.   Yes Gerald Paez MD   apixaban (ELIQUIS) 2.5 mg tablet Take 2.5 mg by mouth 2 (two) times a day.    Gerald Paez MD   clonazePAM (klonoPIN) 0.5 mg tablet Take 0.5 mg by mouth 2 (two) times a week (Sun, Wed).    Gerald Paez MD   QUEtiapine (SEROquel) 100 mg tablet Take 100 mg by mouth nightly.    Gerald Paez MD       CBC Results       02/25/21                          1356           WBC 6.89           RBC 4.64           HGB 14.1           HCT 43.7           MCV 94.2           MCH 30.4           MCHC 32.3                       BMP Results       02/25/21                          1357                      K 3.9           Cl 103           CO2 26           Glucose 93           BUN 15           Creatinine 1.1           Calcium 9.5           Anion Gap 10           EGFR >60.0                      Results from last 7 days   Lab Units 03/02/21  0706   INR  1.0   PTT sec 29       Physical Exam    Airway   Mallampati: II   TM distance: >3 FB   Neck ROM: full  Cardiovascular    Rhythm: regular   Rate: normalPulmonary - normal   clear to auscultation  Dental    Teeth Problems: edentulous      Anesthesia Plan    Plan: MAC and spinal    Technique: spinal and MAC     Lines and Monitors: PIV     Airway: natural airway / supplemental oxygen   ASA 3  Blood Products:     Use of Blood Products Discussed: Yes     Consented to blood products  Anesthetic plan and risks discussed with: patient  Postop Plan:   Patient Disposition: inpatient floor planned admission    Pain Management: IV analgesics and spinal  Comments:    Plan: Patient off apixaban >72 hours

## 2021-03-02 ENCOUNTER — APPOINTMENT (OUTPATIENT)
Dept: RADIOLOGY | Facility: HOSPITAL | Age: 46
Setting detail: HOSPITAL OUTPATIENT SURGERY
DRG: 470 | End: 2021-03-02
Attending: NURSE PRACTITIONER
Payer: COMMERCIAL

## 2021-03-02 ENCOUNTER — HOSPITAL ENCOUNTER (INPATIENT)
Facility: HOSPITAL | Age: 46
LOS: 2 days | Discharge: HOME | DRG: 470 | End: 2021-03-04
Attending: ORTHOPAEDIC SURGERY | Admitting: ORTHOPAEDIC SURGERY
Payer: COMMERCIAL

## 2021-03-02 ENCOUNTER — ANESTHESIA (OUTPATIENT)
Dept: OPERATING ROOM | Facility: HOSPITAL | Age: 46
Setting detail: HOSPITAL OUTPATIENT SURGERY
DRG: 470 | End: 2021-03-02
Payer: COMMERCIAL

## 2021-03-02 DIAGNOSIS — M16.12 OSTEOARTHRITIS OF LEFT HIP, UNSPECIFIED OSTEOARTHRITIS TYPE: Primary | ICD-10-CM

## 2021-03-02 PROBLEM — M16.9 OA (OSTEOARTHRITIS) OF HIP: Status: ACTIVE | Noted: 2021-03-02

## 2021-03-02 PROBLEM — E78.5 HYPERLIPIDEMIA: Status: ACTIVE | Noted: 2021-03-02

## 2021-03-02 LAB
ABO + RH BLD: NORMAL
APTT PPP: 29 SEC (ref 23–35)
BLD GP AB SCN SERPL QL: NEGATIVE
D AG BLD QL: POSITIVE
INR PPP: 1
LABORATORY COMMENT REPORT: NORMAL
PROTHROMBIN TIME: 13 SEC (ref 12.2–14.5)

## 2021-03-02 PROCEDURE — 25000000 HC PHARMACY GENERAL: Performed by: NURSE ANESTHETIST, CERTIFIED REGISTERED

## 2021-03-02 PROCEDURE — 63600000 HC DRUGS/DETAIL CODE: Performed by: ANESTHESIOLOGY

## 2021-03-02 PROCEDURE — 36000015 HC OR LEVEL 5 EA ADDL MIN: Performed by: ORTHOPAEDIC SURGERY

## 2021-03-02 PROCEDURE — 71000011 HC PACU PHASE 1 EA ADDL MIN: Performed by: ORTHOPAEDIC SURGERY

## 2021-03-02 PROCEDURE — 71000001 HC PACU PHASE 1 INITIAL 30MIN: Performed by: ORTHOPAEDIC SURGERY

## 2021-03-02 PROCEDURE — 36000005 HC OR LEVEL 5 INITIAL 30MIN: Performed by: ORTHOPAEDIC SURGERY

## 2021-03-02 PROCEDURE — 63700000 HC SELF-ADMINISTRABLE DRUG: Performed by: NURSE PRACTITIONER

## 2021-03-02 PROCEDURE — 97162 PT EVAL MOD COMPLEX 30 MIN: CPT | Mod: GP | Performed by: PHYSICAL THERAPIST

## 2021-03-02 PROCEDURE — 63600000 HC DRUGS/DETAIL CODE: Performed by: ORTHOPAEDIC SURGERY

## 2021-03-02 PROCEDURE — 27200000 HC STERILE SUPPLY: Performed by: ORTHOPAEDIC SURGERY

## 2021-03-02 PROCEDURE — 12000000 HC ROOM AND CARE MED/SURG

## 2021-03-02 PROCEDURE — 85730 THROMBOPLASTIN TIME PARTIAL: CPT | Performed by: ORTHOPAEDIC SURGERY

## 2021-03-02 PROCEDURE — 63600000 HC DRUGS/DETAIL CODE: Performed by: NURSE PRACTITIONER

## 2021-03-02 PROCEDURE — 37000001 HC ANESTHESIA GENERAL: Performed by: ORTHOPAEDIC SURGERY

## 2021-03-02 PROCEDURE — 25800000 HC PHARMACY IV SOLUTIONS: Performed by: ANESTHESIOLOGY

## 2021-03-02 PROCEDURE — 0SRB03Z REPLACEMENT OF LEFT HIP JOINT WITH CERAMIC SYNTHETIC SUBSTITUTE, OPEN APPROACH: ICD-10-PCS | Performed by: ORTHOPAEDIC SURGERY

## 2021-03-02 PROCEDURE — 25000000 HC PHARMACY GENERAL: Performed by: NURSE PRACTITIONER

## 2021-03-02 PROCEDURE — 36415 COLL VENOUS BLD VENIPUNCTURE: CPT | Performed by: ORTHOPAEDIC SURGERY

## 2021-03-02 PROCEDURE — 99232 SBSQ HOSP IP/OBS MODERATE 35: CPT | Performed by: INTERNAL MEDICINE

## 2021-03-02 PROCEDURE — 63600000 HC DRUGS/DETAIL CODE: Mod: JW | Performed by: NURSE ANESTHETIST, CERTIFIED REGISTERED

## 2021-03-02 PROCEDURE — 85610 PROTHROMBIN TIME: CPT | Performed by: ORTHOPAEDIC SURGERY

## 2021-03-02 PROCEDURE — 25000000 HC PHARMACY GENERAL: Performed by: ORTHOPAEDIC SURGERY

## 2021-03-02 PROCEDURE — C1776 JOINT DEVICE (IMPLANTABLE): HCPCS | Performed by: ORTHOPAEDIC SURGERY

## 2021-03-02 PROCEDURE — 25000000 HC PHARMACY GENERAL: Performed by: ANESTHESIOLOGY

## 2021-03-02 PROCEDURE — 63700000 HC SELF-ADMINISTRABLE DRUG: Performed by: ORTHOPAEDIC SURGERY

## 2021-03-02 PROCEDURE — 86850 RBC ANTIBODY SCREEN: CPT

## 2021-03-02 PROCEDURE — 73501 X-RAY EXAM HIP UNI 1 VIEW: CPT | Mod: LT

## 2021-03-02 DEVICE — BENCOX DELTA HEAD
Type: IMPLANTABLE DEVICE | Site: HIP | Status: FUNCTIONAL
Brand: BENCOX HIP SYSTEM

## 2021-03-02 DEVICE — BENCOX MIRABO CUP (W/O SCREW HOLE PLUG)
Type: IMPLANTABLE DEVICE | Site: HIP | Status: FUNCTIONAL
Brand: BENCOX HIP SYSTEM

## 2021-03-02 DEVICE — BENCOX M STEM LATERALIZED OFFSET (STERILE)
Type: IMPLANTABLE DEVICE | Site: HIP | Status: FUNCTIONAL
Brand: BENCOX HIP SYSTEM

## 2021-03-02 DEVICE — BENCOX MIRABO PE LINER-STANDARD TYPE (GUR1050)
Type: IMPLANTABLE DEVICE | Site: HIP | Status: FUNCTIONAL
Brand: BENCOX HIP SYSTEM

## 2021-03-02 RX ORDER — MIDAZOLAM HYDROCHLORIDE 2 MG/2ML
INJECTION, SOLUTION INTRAMUSCULAR; INTRAVENOUS
Status: COMPLETED
Start: 2021-03-02 | End: 2021-03-02

## 2021-03-02 RX ORDER — LAMOTRIGINE 100 MG/1
200 TABLET ORAL DAILY
Status: DISCONTINUED | OUTPATIENT
Start: 2021-03-03 | End: 2021-03-04 | Stop reason: HOSPADM

## 2021-03-02 RX ORDER — CEFAZOLIN SODIUM/WATER 2 G/20 ML
2 SYRINGE (ML) INTRAVENOUS
Status: COMPLETED | OUTPATIENT
Start: 2021-03-02 | End: 2021-03-02

## 2021-03-02 RX ORDER — BUPROPION HYDROCHLORIDE 100 MG/1
100 TABLET ORAL DAILY
COMMUNITY

## 2021-03-02 RX ORDER — TRANEXAMIC ACID 10 MG/ML
1000 INJECTION, SOLUTION INTRAVENOUS ONCE
Status: DISCONTINUED | OUTPATIENT
Start: 2021-03-02 | End: 2021-03-02 | Stop reason: HOSPADM

## 2021-03-02 RX ORDER — CLONAZEPAM 0.5 MG/1
0.5 TABLET ORAL 2 TIMES DAILY PRN
Status: DISCONTINUED | OUTPATIENT
Start: 2021-03-02 | End: 2021-03-04 | Stop reason: HOSPADM

## 2021-03-02 RX ORDER — AMOXICILLIN 250 MG
1 CAPSULE ORAL 2 TIMES DAILY
Status: DISCONTINUED | OUTPATIENT
Start: 2021-03-02 | End: 2021-03-04 | Stop reason: HOSPADM

## 2021-03-02 RX ORDER — VANCOMYCIN HYDROCHLORIDE 1 G/20ML
INJECTION, POWDER, LYOPHILIZED, FOR SOLUTION INTRAVENOUS AS NEEDED
Status: DISCONTINUED | OUTPATIENT
Start: 2021-03-02 | End: 2021-03-02 | Stop reason: HOSPADM

## 2021-03-02 RX ORDER — MORPHINE SULFATE 2 MG/ML
2 INJECTION, SOLUTION INTRAMUSCULAR; INTRAVENOUS EVERY 4 HOURS PRN
Status: DISCONTINUED | OUTPATIENT
Start: 2021-03-02 | End: 2021-03-02

## 2021-03-02 RX ORDER — POLYETHYLENE GLYCOL 3350 17 G/17G
17 POWDER, FOR SOLUTION ORAL NIGHTLY PRN
Status: DISCONTINUED | OUTPATIENT
Start: 2021-03-02 | End: 2021-03-04 | Stop reason: HOSPADM

## 2021-03-02 RX ORDER — ONDANSETRON HYDROCHLORIDE 2 MG/ML
4 INJECTION, SOLUTION INTRAVENOUS
Status: DISCONTINUED | OUTPATIENT
Start: 2021-03-02 | End: 2021-03-02 | Stop reason: HOSPADM

## 2021-03-02 RX ORDER — ONDANSETRON 4 MG/1
4 TABLET, ORALLY DISINTEGRATING ORAL EVERY 8 HOURS PRN
Status: DISCONTINUED | OUTPATIENT
Start: 2021-03-02 | End: 2021-03-04 | Stop reason: HOSPADM

## 2021-03-02 RX ORDER — FENTANYL CITRATE 50 UG/ML
50 INJECTION, SOLUTION INTRAMUSCULAR; INTRAVENOUS
Status: DISCONTINUED | OUTPATIENT
Start: 2021-03-02 | End: 2021-03-02 | Stop reason: HOSPADM

## 2021-03-02 RX ORDER — DEXAMETHASONE SODIUM PHOSPHATE 4 MG/ML
10 INJECTION, SOLUTION INTRA-ARTICULAR; INTRALESIONAL; INTRAMUSCULAR; INTRAVENOUS; SOFT TISSUE ONCE
Status: COMPLETED | OUTPATIENT
Start: 2021-03-03 | End: 2021-03-03

## 2021-03-02 RX ORDER — DEXTROSE 50 % IN WATER (D50W) INTRAVENOUS SYRINGE
25 AS NEEDED
Status: DISCONTINUED | OUTPATIENT
Start: 2021-03-02 | End: 2021-03-02 | Stop reason: HOSPADM

## 2021-03-02 RX ORDER — KETAMINE HYDROCHLORIDE 50 MG/ML
INJECTION, SOLUTION INTRAMUSCULAR; INTRAVENOUS AS NEEDED
Status: DISCONTINUED | OUTPATIENT
Start: 2021-03-02 | End: 2021-03-02 | Stop reason: SURG

## 2021-03-02 RX ORDER — DIPHENHYDRAMINE HCL 50 MG/ML
25 VIAL (ML) INJECTION EVERY 6 HOURS PRN
Status: DISCONTINUED | OUTPATIENT
Start: 2021-03-02 | End: 2021-03-04 | Stop reason: HOSPADM

## 2021-03-02 RX ORDER — DEXTROSE 50 % IN WATER (D50W) INTRAVENOUS SYRINGE
25 AS NEEDED
Status: DISCONTINUED | OUTPATIENT
Start: 2021-03-02 | End: 2021-03-04 | Stop reason: HOSPADM

## 2021-03-02 RX ORDER — SODIUM CHLORIDE 9 MG/ML
INJECTION, SOLUTION INTRAVENOUS CONTINUOUS
Status: DISCONTINUED | OUTPATIENT
Start: 2021-03-02 | End: 2021-03-03

## 2021-03-02 RX ORDER — SODIUM CHLORIDE 0.9 G/100ML
INJECTION, SOLUTION IRRIGATION AS NEEDED
Status: DISCONTINUED | OUTPATIENT
Start: 2021-03-02 | End: 2021-03-02 | Stop reason: HOSPADM

## 2021-03-02 RX ORDER — BUPROPION HYDROCHLORIDE 75 MG/1
75 TABLET ORAL DAILY PRN
COMMUNITY

## 2021-03-02 RX ORDER — TRANEXAMIC ACID 10 MG/ML
1000 INJECTION, SOLUTION INTRAVENOUS ONCE
Status: COMPLETED | OUTPATIENT
Start: 2021-03-02 | End: 2021-03-02

## 2021-03-02 RX ORDER — OXYCODONE HYDROCHLORIDE 5 MG/1
15-20 TABLET ORAL EVERY 4 HOURS PRN
Status: DISCONTINUED | OUTPATIENT
Start: 2021-03-02 | End: 2021-03-03

## 2021-03-02 RX ORDER — BUPIVACAINE HYDROCHLORIDE 7.5 MG/ML
INJECTION, SOLUTION INTRASPINAL
Status: COMPLETED | OUTPATIENT
Start: 2021-03-02 | End: 2021-03-02

## 2021-03-02 RX ORDER — BUPROPION HYDROCHLORIDE 100 MG/1
100 TABLET ORAL DAILY
Status: DISCONTINUED | OUTPATIENT
Start: 2021-03-03 | End: 2021-03-04 | Stop reason: HOSPADM

## 2021-03-02 RX ORDER — IBUPROFEN 200 MG
16-32 TABLET ORAL AS NEEDED
Status: DISCONTINUED | OUTPATIENT
Start: 2021-03-02 | End: 2021-03-02 | Stop reason: HOSPADM

## 2021-03-02 RX ORDER — ALUMINUM HYDROXIDE, MAGNESIUM HYDROXIDE, AND SIMETHICONE 1200; 120; 1200 MG/30ML; MG/30ML; MG/30ML
30 SUSPENSION ORAL EVERY 4 HOURS PRN
Status: DISCONTINUED | OUTPATIENT
Start: 2021-03-02 | End: 2021-03-04 | Stop reason: HOSPADM

## 2021-03-02 RX ORDER — HYDROMORPHONE HYDROCHLORIDE 1 MG/ML
0.5 INJECTION, SOLUTION INTRAMUSCULAR; INTRAVENOUS; SUBCUTANEOUS
Status: DISCONTINUED | OUTPATIENT
Start: 2021-03-02 | End: 2021-03-02

## 2021-03-02 RX ORDER — OXYCODONE HYDROCHLORIDE 5 MG/1
10-15 TABLET ORAL EVERY 4 HOURS PRN
Status: DISCONTINUED | OUTPATIENT
Start: 2021-03-02 | End: 2021-03-02

## 2021-03-02 RX ORDER — ACETAMINOPHEN 325 MG/1
650 TABLET ORAL
Status: COMPLETED | OUTPATIENT
Start: 2021-03-02 | End: 2021-03-04

## 2021-03-02 RX ORDER — EPHEDRINE SULFATE 50 MG/ML
INJECTION, SOLUTION INTRAVENOUS AS NEEDED
Status: DISCONTINUED | OUTPATIENT
Start: 2021-03-02 | End: 2021-03-02 | Stop reason: SURG

## 2021-03-02 RX ORDER — KETOROLAC TROMETHAMINE 15 MG/ML
15 INJECTION, SOLUTION INTRAMUSCULAR; INTRAVENOUS EVERY 6 HOURS
Status: DISCONTINUED | OUTPATIENT
Start: 2021-03-02 | End: 2021-03-04 | Stop reason: HOSPADM

## 2021-03-02 RX ORDER — DEXAMETHASONE SODIUM PHOSPHATE 4 MG/ML
INJECTION, SOLUTION INTRA-ARTICULAR; INTRALESIONAL; INTRAMUSCULAR; INTRAVENOUS; SOFT TISSUE AS NEEDED
Status: DISCONTINUED | OUTPATIENT
Start: 2021-03-02 | End: 2021-03-02 | Stop reason: SURG

## 2021-03-02 RX ORDER — PROPOFOL 10 MG/ML
INJECTION, EMULSION INTRAVENOUS CONTINUOUS PRN
Status: DISCONTINUED | OUTPATIENT
Start: 2021-03-02 | End: 2021-03-02 | Stop reason: SURG

## 2021-03-02 RX ORDER — CELECOXIB 200 MG/1
200 CAPSULE ORAL ONCE
Status: DISCONTINUED | OUTPATIENT
Start: 2021-03-02 | End: 2021-03-02 | Stop reason: HOSPADM

## 2021-03-02 RX ORDER — ONDANSETRON HYDROCHLORIDE 2 MG/ML
INJECTION, SOLUTION INTRAVENOUS AS NEEDED
Status: DISCONTINUED | OUTPATIENT
Start: 2021-03-02 | End: 2021-03-02 | Stop reason: SURG

## 2021-03-02 RX ORDER — ASPIRIN 325 MG
325 TABLET, DELAYED RELEASE (ENTERIC COATED) ORAL 2 TIMES DAILY
Status: DISCONTINUED | OUTPATIENT
Start: 2021-03-02 | End: 2021-03-04 | Stop reason: HOSPADM

## 2021-03-02 RX ORDER — PANTOPRAZOLE SODIUM 40 MG/1
40 TABLET, DELAYED RELEASE ORAL
Status: DISCONTINUED | OUTPATIENT
Start: 2021-03-03 | End: 2021-03-04 | Stop reason: HOSPADM

## 2021-03-02 RX ORDER — HYDROMORPHONE HYDROCHLORIDE 1 MG/ML
0.5 INJECTION, SOLUTION INTRAMUSCULAR; INTRAVENOUS; SUBCUTANEOUS
Status: DISCONTINUED | OUTPATIENT
Start: 2021-03-02 | End: 2021-03-02 | Stop reason: HOSPADM

## 2021-03-02 RX ORDER — MIDAZOLAM HYDROCHLORIDE 2 MG/2ML
INJECTION, SOLUTION INTRAMUSCULAR; INTRAVENOUS AS NEEDED
Status: DISCONTINUED | OUTPATIENT
Start: 2021-03-02 | End: 2021-03-02 | Stop reason: SURG

## 2021-03-02 RX ORDER — HEPARIN SODIUM 1000 [USP'U]/ML
INJECTION, SOLUTION INTRAVENOUS; SUBCUTANEOUS AS NEEDED
Status: DISCONTINUED | OUTPATIENT
Start: 2021-03-02 | End: 2021-03-02 | Stop reason: SURG

## 2021-03-02 RX ORDER — ROPIVACAINE HYDROCHLORIDE 5 MG/ML
INJECTION, SOLUTION EPIDURAL; INFILTRATION; PERINEURAL AS NEEDED
Status: DISCONTINUED | OUTPATIENT
Start: 2021-03-02 | End: 2021-03-02 | Stop reason: HOSPADM

## 2021-03-02 RX ORDER — POVIDONE-IODINE 5 %
SOLUTION, NON-ORAL OPHTHALMIC (EYE) AS NEEDED
Status: DISCONTINUED | OUTPATIENT
Start: 2021-03-02 | End: 2021-03-02 | Stop reason: HOSPADM

## 2021-03-02 RX ORDER — HYDROMORPHONE HYDROCHLORIDE 1 MG/ML
1 INJECTION, SOLUTION INTRAMUSCULAR; INTRAVENOUS; SUBCUTANEOUS
Status: DISCONTINUED | OUTPATIENT
Start: 2021-03-02 | End: 2021-03-03

## 2021-03-02 RX ORDER — SODIUM CHLORIDE, SODIUM LACTATE, POTASSIUM CHLORIDE, CALCIUM CHLORIDE 600; 310; 30; 20 MG/100ML; MG/100ML; MG/100ML; MG/100ML
INJECTION, SOLUTION INTRAVENOUS CONTINUOUS
Status: DISCONTINUED | OUTPATIENT
Start: 2021-03-02 | End: 2021-03-02

## 2021-03-02 RX ORDER — CEFAZOLIN SODIUM/WATER 2 G/20 ML
2 SYRINGE (ML) INTRAVENOUS
Status: COMPLETED | OUTPATIENT
Start: 2021-03-02 | End: 2021-03-03

## 2021-03-02 RX ORDER — MORPHINE SULFATE 15 MG/1
15 TABLET ORAL DAILY
Status: DISCONTINUED | OUTPATIENT
Start: 2021-03-02 | End: 2021-03-03

## 2021-03-02 RX ORDER — ATORVASTATIN CALCIUM 40 MG/1
40 TABLET, FILM COATED ORAL
Status: DISCONTINUED | OUTPATIENT
Start: 2021-03-02 | End: 2021-03-04 | Stop reason: HOSPADM

## 2021-03-02 RX ORDER — DEXTROSE 40 %
15-30 GEL (GRAM) ORAL AS NEEDED
Status: DISCONTINUED | OUTPATIENT
Start: 2021-03-02 | End: 2021-03-02 | Stop reason: HOSPADM

## 2021-03-02 RX ORDER — ONDANSETRON HYDROCHLORIDE 2 MG/ML
4 INJECTION, SOLUTION INTRAVENOUS EVERY 8 HOURS PRN
Status: DISCONTINUED | OUTPATIENT
Start: 2021-03-02 | End: 2021-03-04 | Stop reason: HOSPADM

## 2021-03-02 RX ORDER — BUPROPION HYDROCHLORIDE 75 MG/1
75 TABLET ORAL NIGHTLY
Status: DISCONTINUED | OUTPATIENT
Start: 2021-03-02 | End: 2021-03-04 | Stop reason: HOSPADM

## 2021-03-02 RX ORDER — BUPROPION HYDROCHLORIDE 100 MG/1
100 TABLET, EXTENDED RELEASE ORAL DAILY
Status: DISCONTINUED | OUTPATIENT
Start: 2021-03-03 | End: 2021-03-02

## 2021-03-02 RX ORDER — IBUPROFEN 200 MG
16-32 TABLET ORAL AS NEEDED
Status: DISCONTINUED | OUTPATIENT
Start: 2021-03-02 | End: 2021-03-04 | Stop reason: HOSPADM

## 2021-03-02 RX ORDER — FENTANYL CITRATE 50 UG/ML
INJECTION, SOLUTION INTRAMUSCULAR; INTRAVENOUS AS NEEDED
Status: DISCONTINUED | OUTPATIENT
Start: 2021-03-02 | End: 2021-03-02 | Stop reason: SURG

## 2021-03-02 RX ORDER — TIZANIDINE 4 MG/1
4 TABLET ORAL EVERY 6 HOURS PRN
Status: DISCONTINUED | OUTPATIENT
Start: 2021-03-02 | End: 2021-03-04 | Stop reason: HOSPADM

## 2021-03-02 RX ORDER — ACETAMINOPHEN 325 MG/1
975 TABLET ORAL ONCE
Status: COMPLETED | OUTPATIENT
Start: 2021-03-02 | End: 2021-03-02

## 2021-03-02 RX ORDER — DEXTROSE 40 %
15-30 GEL (GRAM) ORAL AS NEEDED
Status: DISCONTINUED | OUTPATIENT
Start: 2021-03-02 | End: 2021-03-04 | Stop reason: HOSPADM

## 2021-03-02 RX ORDER — DIPHENHYDRAMINE HCL 25 MG
25 CAPSULE ORAL EVERY 6 HOURS PRN
Status: DISCONTINUED | OUTPATIENT
Start: 2021-03-02 | End: 2021-03-04 | Stop reason: HOSPADM

## 2021-03-02 RX ADMIN — EPHEDRINE SULFATE 10 MG: 50 INJECTION, SOLUTION INTRAVENOUS at 10:02

## 2021-03-02 RX ADMIN — OXYCODONE HYDROCHLORIDE 15 MG: 5 TABLET ORAL at 18:06

## 2021-03-02 RX ADMIN — TIZANIDINE 4 MG: 4 TABLET ORAL at 18:06

## 2021-03-02 RX ADMIN — FENTANYL CITRATE 100 MCG: 50 INJECTION, SOLUTION INTRAMUSCULAR; INTRAVENOUS at 08:38

## 2021-03-02 RX ADMIN — WATER 2 G: 1 INJECTION INTRAVENOUS at 17:30

## 2021-03-02 RX ADMIN — SODIUM CHLORIDE, SODIUM LACTATE, POTASSIUM CHLORIDE, CALCIUM CHLORIDE: 600; 310; 30; 20 INJECTION, SOLUTION INTRAVENOUS at 08:38

## 2021-03-02 RX ADMIN — MORPHINE SULFATE 15 MG: 30 TABLET ORAL at 15:15

## 2021-03-02 RX ADMIN — MIDAZOLAM HYDROCHLORIDE 2 MG: 1 INJECTION, SOLUTION INTRAMUSCULAR; INTRAVENOUS at 08:38

## 2021-03-02 RX ADMIN — KETOROLAC TROMETHAMINE 15 MG: 15 INJECTION, SOLUTION INTRAMUSCULAR; INTRAVENOUS at 17:51

## 2021-03-02 RX ADMIN — EPHEDRINE SULFATE 10 MG: 50 INJECTION, SOLUTION INTRAVENOUS at 08:58

## 2021-03-02 RX ADMIN — CLONAZEPAM 0.5 MG: 0.5 TABLET ORAL at 17:47

## 2021-03-02 RX ADMIN — FENTANYL CITRATE 50 MCG: 50 INJECTION, SOLUTION INTRAMUSCULAR; INTRAVENOUS at 11:35

## 2021-03-02 RX ADMIN — BUPIVACAINE HYDROCHLORIDE IN DEXTROSE 1.8 ML: 7.5 INJECTION, SOLUTION SUBARACHNOID at 08:48

## 2021-03-02 RX ADMIN — ONDANSETRON HYDROCHLORIDE 4 MG: 2 SOLUTION INTRAMUSCULAR; INTRAVENOUS at 11:15

## 2021-03-02 RX ADMIN — MIDAZOLAM HYDROCHLORIDE 2 MG: 1 INJECTION, SOLUTION INTRAMUSCULAR; INTRAVENOUS at 08:39

## 2021-03-02 RX ADMIN — DOCUSATE SODIUM 50 MG AND SENNOSIDES 8.6 MG 1 TABLET: 8.6; 5 TABLET, FILM COATED ORAL at 20:16

## 2021-03-02 RX ADMIN — OXYCODONE 20 MG: 5 TABLET ORAL at 22:30

## 2021-03-02 RX ADMIN — HYDROMORPHONE HYDROCHLORIDE 0.5 MG: 1 INJECTION, SOLUTION INTRAMUSCULAR; INTRAVENOUS; SUBCUTANEOUS at 14:31

## 2021-03-02 RX ADMIN — TRANEXAMIC ACID 1000 MG: 10 INJECTION, SOLUTION INTRAVENOUS at 11:00

## 2021-03-02 RX ADMIN — HEPARIN SODIUM 1000 UNITS: 1000 INJECTION, SOLUTION INTRAVENOUS; SUBCUTANEOUS at 09:12

## 2021-03-02 RX ADMIN — DEXAMETHASONE SODIUM PHOSPHATE 10 MG: 4 INJECTION, SOLUTION INTRA-ARTICULAR; INTRALESIONAL; INTRAMUSCULAR; INTRAVENOUS; SOFT TISSUE at 09:12

## 2021-03-02 RX ADMIN — Medication 2 G: at 09:22

## 2021-03-02 RX ADMIN — KETAMINE HYDROCHLORIDE 95.7 MG: 50 INJECTION, SOLUTION INTRAMUSCULAR; INTRAVENOUS at 09:15

## 2021-03-02 RX ADMIN — ATORVASTATIN CALCIUM 40 MG: 40 TABLET, FILM COATED ORAL at 17:51

## 2021-03-02 RX ADMIN — SODIUM CHLORIDE, SODIUM LACTATE, POTASSIUM CHLORIDE, CALCIUM CHLORIDE: 600; 310; 30; 20 INJECTION, SOLUTION INTRAVENOUS at 10:20

## 2021-03-02 RX ADMIN — FENTANYL CITRATE 50 MCG: 50 INJECTION, SOLUTION INTRAMUSCULAR; INTRAVENOUS at 11:24

## 2021-03-02 RX ADMIN — HYDROMORPHONE HYDROCHLORIDE 0.5 MG: 1 INJECTION, SOLUTION INTRAMUSCULAR; INTRAVENOUS; SUBCUTANEOUS at 18:07

## 2021-03-02 RX ADMIN — ONDANSETRON HYDROCHLORIDE 4 MG: 2 INJECTION, SOLUTION INTRAMUSCULAR; INTRAVENOUS at 09:15

## 2021-03-02 RX ADMIN — MORPHINE SULFATE 2 MG: 2 INJECTION, SOLUTION INTRAMUSCULAR; INTRAVENOUS at 17:30

## 2021-03-02 RX ADMIN — HYDROMORPHONE HYDROCHLORIDE 0.5 MG: 1 INJECTION, SOLUTION INTRAMUSCULAR; INTRAVENOUS; SUBCUTANEOUS at 12:25

## 2021-03-02 RX ADMIN — ASPIRIN 325 MG: 325 TABLET, COATED ORAL at 20:16

## 2021-03-02 RX ADMIN — PROPOFOL INJECTABLE EMULSION 150 MCG/KG/MIN: 10 INJECTION, EMULSION INTRAVENOUS at 09:11

## 2021-03-02 RX ADMIN — HYDROMORPHONE HYDROCHLORIDE 1 MG: 1 INJECTION, SOLUTION INTRAMUSCULAR; INTRAVENOUS; SUBCUTANEOUS at 21:12

## 2021-03-02 RX ADMIN — OXYCODONE HYDROCHLORIDE 15 MG: 5 TABLET ORAL at 13:14

## 2021-03-02 RX ADMIN — ACETAMINOPHEN 975 MG: 325 TABLET ORAL at 07:14

## 2021-03-02 RX ADMIN — HYDROMORPHONE HYDROCHLORIDE 0.5 MG: 1 INJECTION, SOLUTION INTRAMUSCULAR; INTRAVENOUS; SUBCUTANEOUS at 12:02

## 2021-03-02 RX ADMIN — PROMETHAZINE HYDROCHLORIDE 6.25 MG: 25 INJECTION INTRAMUSCULAR; INTRAVENOUS at 11:48

## 2021-03-02 RX ADMIN — ACETAMINOPHEN 650 MG: 325 TABLET ORAL at 17:30

## 2021-03-02 ASSESSMENT — COGNITIVE AND FUNCTIONAL STATUS - GENERAL
MOVING TO AND FROM BED TO CHAIR: 3 - A LITTLE
WALKING IN HOSPITAL ROOM: 3 - A LITTLE
CLIMB 3 TO 5 STEPS WITH RAILING: 3 - A LITTLE
STANDING UP FROM CHAIR USING ARMS: 3 - A LITTLE
AFFECT: ANXIOUS;WFL

## 2021-03-02 NOTE — ASSESSMENT & PLAN NOTE
- Tivicay and Descovy are non-formulary-- patient may take his own medications that he brought with him

## 2021-03-02 NOTE — ASSESSMENT & PLAN NOTE
- H/o, does not wear CPAP at night following weight loss     - EtC02 monitoring per protocol   - Monitor for signs or symptoms of hypercapnea   - Aggressive pulmonary toilet  - GERD/aspiration precautions

## 2021-03-02 NOTE — PLAN OF CARE
Problem: Adult Inpatient Plan of Care  Goal: Plan of Care Review  Outcome: Progressing  Flowsheets (Taken 3/2/2021 1744)  Progress: (eval only) --  Plan of Care Reviewed With: patient  Outcome Summary: PT eval completed in PACU  Goal: Patient-Specific Goal (Individualization)  Outcome: Progressing  Flowsheets (Taken 3/2/2021 1744)  Patient-Specific Goals (Include Timeframe): to go home     Problem: Joint Function Impaired (Hip Arthroplasty)  Goal: Optimal Functional Ability  Outcome: Progressing

## 2021-03-02 NOTE — PROGRESS NOTES
Ortho Post OP Note    pt seen and examined in PACU; resting comfortably   denies pain; denies SOB/CP; denies N/V   pt awakens to verbal stimuli   VSS; no acute distress   LLE dressing CDI   +DP/PT pulses; feet warm, pink, brisk cap refill  Compartments soft/nontender and compressible  sensation and motor decreased secondary to spinal anesthesia  [place]  SCDs       A/P POD 0 Procedure(s) (LRB):  Left total hip replacement (Left)  post op Xray in PACU   pain management  DVT ppx: SCDS, early ambulation,  mg BIDx 7 days then ELIQUIS   PT/OT OOB WBAT  Monitor I&Os   Neurovascular checks   IV abx post-op      medical management per  [facility]      dispo: 4W           Ortho addendum   Motor check   BL LE: dressing CDI   SILT; +DP/PT pulses   Motor intact, +DF/PF/EHL  Compartments soft/nontender

## 2021-03-02 NOTE — ANESTHESIA PROCEDURE NOTES
Spinal Block    Patient location during procedure: holding area  Start time: 3/2/2021 8:38 AM  End time: 3/2/2021 8:44 AM  Staffing  Anesthesiologist: Damon Verdugo MD  Resident/CRNA: Eunice Anguiano CRNA  Performed: anesthesiologist   Reason for block: at surgeon's request  Preanesthetic Checklist  Completed: patient identified, site marked, surgical consent, pre-op evaluation, timeout performed, IV checked, risks and benefits discussed, monitors and equipment checked and sterile field maintained during procedure  Spinal Block  Patient position: sitting  Prep: ChloraPrep and site prepped and draped  Patient monitoring: heart rate, cardiac monitor, continuous pulse ox and blood pressure  Approach: midline  Location: L3-4  Injection technique: single-shot  Needle  Needle type: Sprotte   Needle gauge: 24 G  Needle length: 3.5 in  Assessment  Sensory level: T10  Events: cerebrospinal fluid  Additional Notes  .Procedure well tolerated. Vital signs stable. No paresthesia.  Analgesia satisfactory. Patients nurse to notify anesthesiologist if hemodynamically unstable. 1% lido wheal at L3-L4.    Medications Administered -   Bupivacaine PF (MARCAINE SPINAL) 0.75% intrathecal injection, 1.8 mL

## 2021-03-02 NOTE — HOSPITAL COURSE
Marco A is a 45 y.o. male admitted on 3/2/2021 with OA (osteoarthritis) of hip.     Past Medical History  Marco A has a past medical history of Anemia, Bipolar 1 disorder (CMS/[facility]), Deep vein thrombosis (CMS/[facility]) (2009), Depression, HIV disease (CMS/[facility]), Pancreatitis, and Sleep apnea (2004).    History of Present Illness   s/p L MIGUEL 3/2/21 Dr. Rick, no hip prec's noted, WBAT

## 2021-03-02 NOTE — ASSESSMENT & PLAN NOTE
- Follows with GI and pain management as outpatient -- follow-up as previously scheduled   - Pain managment as per ortho/neurology service

## 2021-03-02 NOTE — BRIEF OP NOTE
Left total hip replacement (L) Procedure Note    Procedure:    Left total hip replacement  CPT(R) Code:  21494 - SC TOTAL HIP ARTHROPLASTY      Pre-op Diagnosis     * Osteoarthritis of left hip, unspecified osteoarthritis type [M16.12]       Post-op Diagnosis     * Osteoarthritis of left hip, unspecified osteoarthritis type [M16.12]    Surgeon(s) and Role:     * Pradeep Rick MD - Primary    Anesthesia: Spinal    Staff:   Circulator: Adrianna Abreu RN; Sabina Armendariz RN  Scrub Person: Ivette Christensen RN  Registered Nurse First Assistant: Nataliia Flannery CRNP [place]: Igor Salgado    Procedure Details   SEE OP NOTE    Estimated Blood Loss: 250 mL    Specimens:                Order Name Source Comment Collection Info Order Time   TYPE AND SCREEN Blood, Venous  Collected By: Rafaela Morgan RN 3/2/2021  7:03 AM     Are you aware of this patient having previously identified antibodies?   NO          Does this Patient have Sickle Cell Disease/Sickle Cell Anemia?   NO          Release to patient   Immediate        PROTIME-INR Blood, Venous  Collected By: Rafaela Morgan RN 3/2/2021  7:06 AM     Release to patient   Immediate        PTT Blood, Venous  Collected By: Rafaela Morgan RN 3/2/2021  7:06 AM     Release to patient   Immediate              Drains:   Urethral Catheter Latex 16 Fr (Active)       Implants:   Implant Name Type Inv. Item Serial No.  Lot No. LRB No. Used Action   LINER ACETAB BENCOX MIRABO 36MM/SZ E - [id number]  LINER ACETAB BENCOX MIRABO 36MM/SZ E  [employer] [id number] Left 1 Implanted   CUP ACETAB BENCOX MIRABO 3HOLE 58MM - [id number]  CUP ACETAB BENCOX MIRABO 3HOLE 58MM  [employer] [id number] Left 1 Implanted   FEM HEAD 36MM +4LONG - [id number] Femoral head FEM HEAD 36MM +4LONG  [employer] [id number] Left 1 Implanted   HIP STEM SZ 7 BENCOX M LATERAL - [id number]  HIP STEM SZ 7 BENCOX M LATERAL  [employer] [id number] Left 1 Implanted               Complications:  None; patient tolerated the procedure well.           Disposition: PACU - hemodynamically stable.           Condition: stable    Pradeep Rick MD  Phone Number: [phone]

## 2021-03-02 NOTE — PROGRESS NOTES
Inpatient Cardiology   Daily Progress Note       Overview: S/p L THR with Dr. Rick on 3/2/21     Assessment/Plan   DJD (degenerative joint disease)  Assessment & Plan  - DVT prophylaxis and pain management per ortho service  - Pulmonary toilet  - Ambulate  - Hold all natural supplements. Resume post op when ok to do so per ortho     Hyperlipidemia  Assessment & Plan  - Daily statin    Asymptomatic HIV infection (CMS/HCC)  Assessment & Plan  - Tivicay and Descovy are non-formulary-- patient may take his own medications that he brought with him    Chronic pancreatitis (CMS/[facility])  Assessment & Plan  - Follows with GI and pain management as outpatient -- follow-up as previously scheduled   - Pain managment as per ortho    Bipolar 1 disorder (CMS/HCC)  Assessment & Plan  - Continue Lamictal and Wellbutrin  - Reports he does not take Seroquel frequently   - Benzos per ortho    DVT (deep venous thrombosis) (CMS/HCC)  Assessment & Plan  - Resume eliquis when cleared with ortho service  - DVT prophylaxis as per ortho    HARPER (obstructive sleep apnea)  Assessment & Plan  - H/o, does not wear CPAP at night following weight loss     - EtC02 monitoring per protocol   - Monitor for signs or symptoms of hypercapnea   - Aggressive pulmonary toilet  - GERD/aspiration precautions           Subjective  Seen and examined in PACU. Residual spinal anesthesia providing pain control. D/w PACU RN-- no issues noted. C/o mild nausea. Denies chest pain, shortness of breath, palpitations, lightheadedness,or HA.       Current Medications:    Scheduled:  • dolutegravir  50 mg oral q AM   • promethazine  6.25 mg intravenous Once       Infusions:  • lactated ringer's   Stopped (03/02/21 1044)   • lactated ringer's       • sodium chloride 0.9 %           PRN:  •  glucose **OR** dextrose **OR** glucagon **OR** dextrose in water  •  fentaNYL  •  HYDROmorphone  •  ondansetron     Objective   Vital signs in last 24 hours:  Temp:  [36.4 °C (97.5  °F)-37.3 °C (99.1 °F)] 36.4 °C (97.5 °F)  Heart Rate:  [68-73] 68  Resp:  [18-20] 18  BP: ()/(64-68) 97/64    Wt Readings from Last 3 Encounters:   03/02/21 95.7 kg (211 lb)   02/25/21 96.8 kg (213 lb 8 oz)   02/25/21 97.5 kg (215 lb)       Physical Exam   Constitutional: He appears well-developed and well-nourished. No distress.   HENT:   Head: Normocephalic and atraumatic.   Eyes: EOM are normal. No scleral icterus.   Neck: Neck supple. No JVD present.   Cardiovascular: Normal rate and regular rhythm.   No murmur heard.  Pulses:       Dorsalis pedis pulses are 2+ on the right side and 2+ on the left side.   Pulmonary/Chest: Effort normal. No respiratory distress. He has no wheezes. He has no rales. He exhibits no tenderness.   Clear anteriorly    Abdominal: Soft. He exhibits no distension. There is no abdominal tenderness. There is no rebound and no guarding.   Hypoactive    Musculoskeletal:         General: No edema.      Comments: L hip dressing CDI. Decrease sensation/motor r/t spinal anesthesia    Neurological: He is alert.   Skin: Skin is warm and dry.   Psychiatric: He has a normal mood and affect.   Vitals reviewed.       Labs and Data:      Hematology  Lab Results   Component Value Date    WBC 6.89 02/25/2021    HGB 14.1 02/25/2021    HCT 43.7 02/25/2021     02/25/2021    INR 1.0 03/02/2021       Chemistries  Lab Results   Component Value Date     02/25/2021    K 3.9 02/25/2021     02/25/2021    CREATININE 1.1 02/25/2021    BUN 15 02/25/2021    CO2 26 02/25/2021    GLUCOSE 93 02/25/2021    CALCIUM 9.5 02/25/2021         Cardiology:    Telemetry  SR in PACU             GABY Camara  3/2/2021

## 2021-03-02 NOTE — ANESTHESIOLOGIST PRE-PROCEDURE ATTESTATION
Pre-Procedure Patient Identification:  I am the Primary Anesthesiologist and have identified the patient on 03/02/21 at 8:30 AM.   I have confirmed the following procedure(s) Left total hip replacement (L) will be performed by the following surgeon/proceduralist Pradeep Rick MD.

## 2021-03-02 NOTE — PROGRESS NOTES
"Patient: Sam Fitzgerald Chevalier  Location: [place] [address]  MRN: [id number]  Today's date: 3/2/2021     Pt left supine in bed and resting comfortably with all lines arranged, incontinence pad underneath, SCD's in place, call bell and personal items within reach and bed alarm activated. Nurse aware of pt's performance and positioning.       Marco A is a 45 y.o. male admitted on 3/2/2021 with OA (osteoarthritis) of hip.     Past Medical History  Marco A has a past medical history of Anemia, Bipolar 1 disorder (CMS/[facility]), Deep vein thrombosis (CMS/[facility]) (2009), Depression, HIV disease (CMS/[facility]), Pancreatitis, and Sleep apnea (2004).    History of Present Illness   s/p L MIGUEL 3/2/21 Dr. Rick, no hip prec's noted, WBAT     PT Vitals    Date/Time Pulse Resp SpO2 Pt Activity O2 Therapy BP MAP BP Location BP Method Pt Position [facility]   03/02/21 1528 69 -- 97 % At rest None (Room air) 118/80 -- Left upper arm Automatic Lying    03/02/21 1530 58 16 96 % -- -- 118/80 93 mmHg -- -- -- JMR   03/02/21 1545 82 27 -- -- -- -- -- -- -- -- JMR      PT Pain    Date/Time Pain Type Pref Pain Scale Side Orientation Location Rating: Rest Rating: Activity Interventions [facility]   03/02/21 1528 Pain Assessment number (Numeric Rating Pain Scale) Left incisional hip 10 10 premedicated for activity;position adjusted;care clustered;cold applied KM          Prior Living Environment      Most Recent Value   Living Arrangements  house   Living Environment Comment  alone in 3SH, approx 30 STEPS DOWN TO ENTER w/ L rail descending, 1/2 bath first floor, FF w/ right side rail to bedroom/office/full bath, \"High\" tub shower           Prior Level of Function      Most Recent Value   Dominant Hand  right   Ambulation  independent   Transferring  independent   Toileting  independent   Bathing  independent   Dressing  independent   Eating  independent   Prior Level of Function Comment  indep all mobiity/self-care/ADL w/out AD/DME prior    Assistive " Device/Animal Currently Used at Home  other (see comments) [suction grab bar in tub ]          PT Evaluation and Treatment - 03/02/21 1528        Time Calculation    Start Time  1528     Stop Time  1556     Time Calculation (min)  28 min        Session Details    Document Type  initial evaluation     Mode of Treatment  physical therapy;individual therapy        General Information    Patient Profile Reviewed?  yes     Onset of Illness/Injury or Date of Surgery  03/02/21     Referring Physician  Prabhjot     Patient/Family/Caregiver Comments/Observations  RN cleared pt for PT eval in PACU      General Observations of Patient  pt rec'd in bed in PACU, very anxious to get OOB and c/o 10/10 pain at rest      Existing Precautions/Restrictions  weight bearing        Weight-Bearing Status    Left LE Weight-Bearing Status  weight-bearing as tolerated (WBAT)        Cognition/Psychosocial    Affect/Mental Status (Cognitive)  anxious;WFL     Orientation Status (Cognition)  oriented x 4     Follows Commands (Cognition)  WNL        Sensory Assessment (Somatosensory)    Sensory Assessment (Somatosensory)  LE sensation intact;bilateral LE     Bilateral LE Sensory Assessment  light touch awareness        Range of Motion (ROM)    Range of Motion  left lower extremity ROM deficit     Left Lower Extremity (ROM)  left LE ROM is WFL except;hip;knee     Hip, Left (ROM)  flex 75, abd 10     Knee, Left (ROM)  flex 80         Strength (Manual Muscle Testing)    Strength (Manual Muscle Testing)  left lower extremity strength deficit     Left Lower Extremity Strength  left LE strength is WFL except;hip;knee     Hip, Left (Strength)  1+ to 2-, pain-limited      Knee, Left (Strength)  ext 3+, flex 3-         Edema Assessment & Management    Location (Edema)  lower extremity, left        Lower Extremity Edema Measures, Left    Lower Extremity, Left (Edema)  knee;mid-thigh        Bed Mobility    [place], Supine to Sit  maximum assist (25-49%  patient effort);modified independence    max A for L LE mgt, mod I w/ signif use of rail for trunk     [place], Sit to Supine  maximum assist (25-49% patient effort);minimum assist (75% or more patient effort)    max A for LLE mgt, min A for trunk     Assistive Device (Bed Mobility)  bed rails;head of bed elevated     Comment (Bed Mobility)  signif verbal/visual pain behavior         Sit to Stand Transfer    [place], Sit to Stand Transfer  minimum assist (75% or more patient effort);verbal cues     Verbal Cues  hand placement;technique     Assistive Device  walker, front-wheeled     Comment  from EOB, txfr training for safe technique        Stand to Sit Transfer    [place], Stand to Sit Transfer  minimum assist (75% or more patient effort);nonverbal cues (demo/gesture);verbal cues     Verbal Cues  preparatory posture;hand placement;technique;safety     Assistive Device  walker, front-wheeled     Comment  to EOB  , as above         Gait Training    [place], Gait  minimum assist (75% or more patient effort);verbal cues     Assistive Device  walker, front-wheeled     Distance in Feet  53 feet     Gait Pattern Utilized  step-to     Deviations/Abnormal Patterns (Gait)  antalgic;base of support, wide;gait speed decreased;step length decreased;left sided deviations     Left Sided Gait Deviations  hip circumduction;heel strike decreased     Maintains Weight-Bearing Status  able to maintain     Comment  gt training emphasizing heel-toe pattern, decr L circumduction and WBAT w/ use of RW         Stairs Training    [place], Stairs  not tested     Comment  tba        Safety Issues, Functional Mobility    Impairments Affecting Function (Mobility)  pain;strength;range of motion;endurance/activity tolerance        Balance    Balance Assessment  sitting static balance;sit to stand dynamic balance;standing static balance     Static Sitting Balance  mild impairment;unsupported;asymmetrical weight  shifting;sitting, edge of bed     Sit to Stand Dynamic Balance  mild impairment;supported     Static Standing Balance  mild impairment;supported;asymmetrical weight shifting        AM-PAC (TM) - Mobility (Current Function)    Turning from your back to your side while in a flat bed without using bedrails?  3 - A Little     Moving from lying on your back to sitting on the side of a flat bed without using bedrails?  3 - A Little     Moving to and from a bed to a chair?  3 - A Little     Standing up from a chair using your arms?  3 - A Little     To walk in a hospital room?  3 - A Little     Climbing 3-5 steps with a railing?  3 - A Little     AM-PAC (TM) Mobility Score  18        Therapy Assessment/Plan (PT)    Rehab Potential (PT)  good, to achieve stated therapy goals     Therapy Frequency (PT)  5-7 times/wk     Criteria for Skilled Interventions Met (PT)  yes;skilled treatment is necessary     Problem List  strength;pain;range of motion (ROM);balance     Activity Limitations Related to Problem List  unable to transfer safely;unable to ambulate safely     Patient/Family Therapy Goals Statement (PT)  to go home and be safe         Therapy Plan Review/Discharge Plan (PT)    PT Recommended Discharge Disposition  home;home with outpatient services     Anticipated Equipment Needs at Discharge (PT Eval)  walker, front-wheeled     Therapy Plan Review (PT)  participants included;patient;evaluation/treatment results reviewed;care plan/treatment goals reviewed;risks/benefits reviewed;participants voiced agreement with care plan                       Education provided this session. See the Patient Education summary report for full details.    PT Goals      Most Recent Value   Bed Mobility Goal 1   Activity/Assistive Device  bed mobility activities, all at 03/02/2021 1528   [place]  modified independence, independent at 03/02/2021 1528   Time Frame  by discharge at 03/02/2021 1528   Progress/Outcome  goal ongoing at  03/02/2021 1528   Transfer Goal 1   Activity/Assistive Device  sit-to-stand/stand-to-sit, car transfer, walker, front-wheeled at 03/02/2021 1528   [place]  modified independence at 03/02/2021 1528   Time Frame  by discharge at 03/02/2021 1528   Progress/Outcome  goal ongoing at 03/02/2021 1528   Gait Training Goal 1   Activity/Assistive Device  walker, front-wheeled, diminish gait deviation, increase endurance/gait distance at 03/02/2021 1528   [place]  modified independence at 03/02/2021 1528   Distance  150 at 03/02/2021 1528   Time Frame  by discharge at 03/02/2021 1528   Progress/Outcome  goal ongoing at 03/02/2021 1528   Stairs Goal 1   Activity/Assistive Device  using handrail, right, cane, straight, step-to-step, decrease fall risk at 03/02/2021 1528   [place]  modified independence at 03/02/2021 1528   Number of Stairs  12 at 03/02/2021 1528   Time Frame  by discharge at 03/02/2021 1528   Progress/Outcome  goal ongoing at 03/02/2021 1528

## 2021-03-02 NOTE — ASSESSMENT & PLAN NOTE
- Resume eliquis when cleared with ortho service. Per notes, to be resumed following 7 days of Aspirin BID  - DVT prophylaxis as per ortho

## 2021-03-02 NOTE — ASSESSMENT & PLAN NOTE
- Continue Lamictal and Wellbutrin  - Reports he does not take Seroquel frequently   - Benzos per ortho

## 2021-03-02 NOTE — OR SURGEON
Pre-Procedure patient identification:  I am the primary operating surgeon/proceduralist and I have identified the patient on 03/02/21 at 6:26 AM Pradeep Rick MD  Phone Number: [phone]

## 2021-03-02 NOTE — ASSESSMENT & PLAN NOTE
- DVT prophylaxis as per ortho service  - Pain management as per neurology   - Pulmonary toilet  - Ambulate  - Hold all natural supplements. Resume post op when ok to do so per ortho

## 2021-03-03 LAB
ANION GAP SERPL CALC-SCNC: 7 MEQ/L (ref 3–15)
BUN SERPL-MCNC: 16 MG/DL (ref 8–20)
CALCIUM SERPL-MCNC: 8.7 MG/DL (ref 8.9–10.3)
CHLORIDE SERPL-SCNC: 107 MEQ/L (ref 98–109)
CO2 SERPL-SCNC: 25 MEQ/L (ref 22–32)
CREAT SERPL-MCNC: 0.7 MG/DL (ref 0.8–1.3)
ERYTHROCYTE [DISTWIDTH] IN BLOOD BY AUTOMATED COUNT: 13.9 % (ref 11.6–14.4)
GFR SERPL CREATININE-BSD FRML MDRD: >60 ML/MIN/1.73M*2
GLUCOSE SERPL-MCNC: 111 MG/DL (ref 70–99)
HCT VFR BLDCO AUTO: 38.6 % (ref 40.1–51)
HGB BLD-MCNC: 12.7 G/DL (ref 13.7–17.5)
MCH RBC QN AUTO: 30.8 PG (ref 28–33.2)
MCHC RBC AUTO-ENTMCNC: 32.9 G/DL (ref 32.2–36.5)
MCV RBC AUTO: 93.7 FL (ref 83–98)
PDW BLD AUTO: 10 FL (ref 9.4–12.4)
PLATELET # BLD AUTO: 237 K/UL (ref 150–350)
POTASSIUM SERPL-SCNC: 3.7 MEQ/L (ref 3.6–5.1)
RBC # BLD AUTO: 4.12 M/UL (ref 4.5–5.8)
SARS-COV-2 RNA RESP QL NAA+PROBE: NEGATIVE
SODIUM SERPL-SCNC: 139 MEQ/L (ref 136–144)
WBC # BLD AUTO: 7.04 K/UL (ref 3.8–10.5)

## 2021-03-03 PROCEDURE — 36415 COLL VENOUS BLD VENIPUNCTURE: CPT | Performed by: NURSE PRACTITIONER

## 2021-03-03 PROCEDURE — 63700000 HC SELF-ADMINISTRABLE DRUG: Performed by: PSYCHIATRY & NEUROLOGY

## 2021-03-03 PROCEDURE — 97166 OT EVAL MOD COMPLEX 45 MIN: CPT | Mod: GO

## 2021-03-03 PROCEDURE — 25000000 HC PHARMACY GENERAL: Performed by: NURSE PRACTITIONER

## 2021-03-03 PROCEDURE — 97116 GAIT TRAINING THERAPY: CPT | Mod: GP | Performed by: PHYSICAL THERAPIST

## 2021-03-03 PROCEDURE — 80048 BASIC METABOLIC PNL TOTAL CA: CPT | Performed by: NURSE PRACTITIONER

## 2021-03-03 PROCEDURE — 63700000 HC SELF-ADMINISTRABLE DRUG: Performed by: ORTHOPAEDIC SURGERY

## 2021-03-03 PROCEDURE — 85027 COMPLETE CBC AUTOMATED: CPT | Performed by: NURSE PRACTITIONER

## 2021-03-03 PROCEDURE — 99232 SBSQ HOSP IP/OBS MODERATE 35: CPT | Performed by: INTERNAL MEDICINE

## 2021-03-03 PROCEDURE — 97530 THERAPEUTIC ACTIVITIES: CPT | Mod: GP | Performed by: PHYSICAL THERAPIST

## 2021-03-03 PROCEDURE — 63600000 HC DRUGS/DETAIL CODE: Performed by: ORTHOPAEDIC SURGERY

## 2021-03-03 PROCEDURE — 63700000 HC SELF-ADMINISTRABLE DRUG: Performed by: NURSE PRACTITIONER

## 2021-03-03 PROCEDURE — 12000000 HC ROOM AND CARE MED/SURG

## 2021-03-03 PROCEDURE — U0003 INFECTIOUS AGENT DETECTION BY NUCLEIC ACID (DNA OR RNA); SEVERE ACUTE RESPIRATORY SYNDROME CORONAVIRUS 2 (SARS-COV-2) (CORONAVIRUS DISEASE [COVID-19]), AMPLIFIED PROBE TECHNIQUE, MAKING USE OF HIGH THROUGHPUT TECHNOLOGIES AS DESCRIBED BY CMS-2020-01-R: HCPCS | Performed by: NURSE PRACTITIONER

## 2021-03-03 PROCEDURE — 63600000 HC DRUGS/DETAIL CODE: Performed by: NURSE PRACTITIONER

## 2021-03-03 RX ORDER — OXYCODONE HYDROCHLORIDE 5 MG/1
5 TABLET ORAL 3 TIMES DAILY PRN
Status: DISCONTINUED | OUTPATIENT
Start: 2021-03-03 | End: 2021-03-04 | Stop reason: HOSPADM

## 2021-03-03 RX ORDER — OXYCODONE HYDROCHLORIDE 5 MG/1
20 TABLET ORAL EVERY 8 HOURS
Status: DISCONTINUED | OUTPATIENT
Start: 2021-03-03 | End: 2021-03-03

## 2021-03-03 RX ORDER — CYCLOBENZAPRINE HCL 5 MG
5 TABLET ORAL NIGHTLY
Status: DISCONTINUED | OUTPATIENT
Start: 2021-03-03 | End: 2021-03-04 | Stop reason: HOSPADM

## 2021-03-03 RX ORDER — ECHINACEA 400 MG
1 CAPSULE ORAL DAILY
COMMUNITY
End: 2021-03-04 | Stop reason: HOSPADM

## 2021-03-03 RX ORDER — GLUCOSAMINE/CHONDRO SU A 500-400 MG
1 TABLET ORAL 2 TIMES DAILY
COMMUNITY
End: 2021-03-04 | Stop reason: HOSPADM

## 2021-03-03 RX ORDER — OXYCODONE HYDROCHLORIDE 5 MG/1
10 TABLET ORAL EVERY 4 HOURS PRN
Status: DISCONTINUED | OUTPATIENT
Start: 2021-03-03 | End: 2021-03-04 | Stop reason: HOSPADM

## 2021-03-03 RX ORDER — CALCIUM CARBONATE 200(500)MG
1 TABLET,CHEWABLE ORAL DAILY
COMMUNITY
End: 2021-03-04 | Stop reason: HOSPADM

## 2021-03-03 RX ADMIN — ONDANSETRON 4 MG: 4 TABLET, ORALLY DISINTEGRATING ORAL at 00:29

## 2021-03-03 RX ADMIN — ASPIRIN 325 MG: 325 TABLET, COATED ORAL at 19:53

## 2021-03-03 RX ADMIN — TIZANIDINE 4 MG: 4 TABLET ORAL at 06:44

## 2021-03-03 RX ADMIN — ACETAMINOPHEN 650 MG: 325 TABLET ORAL at 00:15

## 2021-03-03 RX ADMIN — OXYCODONE 20 MG: 5 TABLET ORAL at 02:50

## 2021-03-03 RX ADMIN — PANTOPRAZOLE SODIUM 40 MG: 40 TABLET, DELAYED RELEASE ORAL at 06:42

## 2021-03-03 RX ADMIN — ACETAMINOPHEN 650 MG: 325 TABLET ORAL at 06:43

## 2021-03-03 RX ADMIN — OXYCODONE 10 MG: 5 TABLET ORAL at 23:50

## 2021-03-03 RX ADMIN — TIZANIDINE 4 MG: 4 TABLET ORAL at 20:01

## 2021-03-03 RX ADMIN — BUPROPION HYDROCHLORIDE 100 MG: 100 TABLET ORAL at 10:00

## 2021-03-03 RX ADMIN — WATER 2 G: 1 INJECTION INTRAVENOUS at 00:31

## 2021-03-03 RX ADMIN — CLONAZEPAM 0.5 MG: 0.5 TABLET ORAL at 12:24

## 2021-03-03 RX ADMIN — KETOROLAC TROMETHAMINE 15 MG: 15 INJECTION, SOLUTION INTRAMUSCULAR; INTRAVENOUS at 17:50

## 2021-03-03 RX ADMIN — KETOROLAC TROMETHAMINE 15 MG: 15 INJECTION, SOLUTION INTRAMUSCULAR; INTRAVENOUS at 23:55

## 2021-03-03 RX ADMIN — ASPIRIN 325 MG: 325 TABLET, COATED ORAL at 10:00

## 2021-03-03 RX ADMIN — CLONAZEPAM 0.5 MG: 0.5 TABLET ORAL at 21:48

## 2021-03-03 RX ADMIN — CYCLOBENZAPRINE HYDROCHLORIDE 5 MG: 5 TABLET, FILM COATED ORAL at 21:48

## 2021-03-03 RX ADMIN — OXYCODONE 10 MG: 5 TABLET ORAL at 19:54

## 2021-03-03 RX ADMIN — HYDROMORPHONE HYDROCHLORIDE 1 MG: 1 INJECTION, SOLUTION INTRAMUSCULAR; INTRAVENOUS; SUBCUTANEOUS at 00:16

## 2021-03-03 RX ADMIN — KETOROLAC TROMETHAMINE 15 MG: 15 INJECTION, SOLUTION INTRAMUSCULAR; INTRAVENOUS at 00:16

## 2021-03-03 RX ADMIN — KETOROLAC TROMETHAMINE 15 MG: 15 INJECTION, SOLUTION INTRAMUSCULAR; INTRAVENOUS at 06:44

## 2021-03-03 RX ADMIN — KETOROLAC TROMETHAMINE 15 MG: 15 INJECTION, SOLUTION INTRAMUSCULAR; INTRAVENOUS at 12:24

## 2021-03-03 RX ADMIN — OXYCODONE 10 MG: 5 TABLET ORAL at 15:40

## 2021-03-03 RX ADMIN — HYDROMORPHONE HYDROCHLORIDE 1 MG: 1 INJECTION, SOLUTION INTRAMUSCULAR; INTRAVENOUS; SUBCUTANEOUS at 04:54

## 2021-03-03 RX ADMIN — ONDANSETRON 4 MG: 4 TABLET, ORALLY DISINTEGRATING ORAL at 17:11

## 2021-03-03 RX ADMIN — TIZANIDINE 4 MG: 4 TABLET ORAL at 00:16

## 2021-03-03 RX ADMIN — DOCUSATE SODIUM 50 MG AND SENNOSIDES 8.6 MG 1 TABLET: 8.6; 5 TABLET, FILM COATED ORAL at 10:00

## 2021-03-03 RX ADMIN — LAMOTRIGINE 200 MG: 100 TABLET ORAL at 10:00

## 2021-03-03 RX ADMIN — ACETAMINOPHEN 650 MG: 325 TABLET ORAL at 17:49

## 2021-03-03 RX ADMIN — OXYCODONE 15 MG: 5 TABLET ORAL at 06:47

## 2021-03-03 RX ADMIN — DOCUSATE SODIUM 50 MG AND SENNOSIDES 8.6 MG 1 TABLET: 8.6; 5 TABLET, FILM COATED ORAL at 19:53

## 2021-03-03 RX ADMIN — OXYCODONE HYDROCHLORIDE 20 MG: 5 TABLET ORAL at 10:00

## 2021-03-03 RX ADMIN — DEXAMETHASONE SODIUM PHOSPHATE 10 MG: 4 INJECTION, SOLUTION INTRA-ARTICULAR; INTRALESIONAL; INTRAMUSCULAR; INTRAVENOUS; SOFT TISSUE at 10:00

## 2021-03-03 RX ADMIN — OXYCODONE 5 MG: 5 TABLET ORAL at 17:55

## 2021-03-03 RX ADMIN — ACETAMINOPHEN 650 MG: 325 TABLET ORAL at 23:51

## 2021-03-03 RX ADMIN — OXYCODONE 5 MG: 5 TABLET ORAL at 14:15

## 2021-03-03 RX ADMIN — ACETAMINOPHEN 650 MG: 325 TABLET ORAL at 12:23

## 2021-03-03 RX ADMIN — ATORVASTATIN CALCIUM 40 MG: 40 TABLET, FILM COATED ORAL at 17:50

## 2021-03-03 RX ADMIN — TIZANIDINE 4 MG: 4 TABLET ORAL at 14:14

## 2021-03-03 ASSESSMENT — COGNITIVE AND FUNCTIONAL STATUS - GENERAL
CLIMB 3 TO 5 STEPS WITH RAILING: 4 - NONE
AFFECT: WFL;ANXIOUS
WALKING IN HOSPITAL ROOM: 4 - NONE
AFFECT: WFL;ANXIOUS
DRESSING REGULAR LOWER BODY CLOTHING: 4 - NONE
MOVING TO AND FROM BED TO CHAIR: 4 - NONE
TOILETING: 4 - NONE
DRESSING REGULAR UPPER BODY CLOTHING: 4 - NONE
HELP NEEDED FOR BATHING: 4 - NONE
EATING MEALS: 4 - NONE
HELP NEEDED FOR PERSONAL GROOMING: 4 - NONE
STANDING UP FROM CHAIR USING ARMS: 4 - NONE

## 2021-03-03 NOTE — ANESTHESIA POSTPROCEDURE EVALUATION
Patient: Sam Fitzgerald Chevalier    Procedure Summary     Date: 03/02/21 Room / Location:  OR 6 /  OR    Anesthesia Start: 0905 Anesthesia Stop: 1045    Procedure: Left total hip replacement (Left Hip) Diagnosis:       Osteoarthritis of left hip, unspecified osteoarthritis type      (left hip djd)    Surgeon: Pradeep Rick MD Responsible Provider: Damon Verdugo MD    Anesthesia Type: general ASA Status: 3          Anesthesia Type: general  PACU Vitals  3/2/2021 1040 - 3/2/2021 1140      3/2/2021  1110 3/2/2021  1130          BP:  97/64  105/80      Temp:  36.4 °C (97.5 °F)  --      Pulse:  68  63      Resp:  18  13      SpO2:  100 %  100 %              Anesthesia Post Evaluation    Pain management: adequate  Mode of pain management: IV medication  Patient location during evaluation: PACU  Patient participation: complete - patient participated  Level of consciousness: awake and alert  Cardiovascular status: acceptable  Airway Patency: adequate  Respiratory status: acceptable  Hydration status: acceptable  Anesthetic complications: no

## 2021-03-03 NOTE — PLAN OF CARE
Problem: Adult Inpatient Plan of Care  Goal: Plan of Care Review  Outcome: Progressing  Flowsheets (Taken 3/3/2021 1129)  Plan of Care Reviewed With: patient  Outcome Summary: all goals met cleared for d/c to home     Problem: Joint Function Impaired (Hip Arthroplasty)  Goal: Optimal Functional Ability  Outcome: Progressing

## 2021-03-03 NOTE — CONSULTS
Brief Nutrition Assessment Reason for consult: [facility]     Nutrition Interventions/Recommendations:   1. Continue with regular diet as tolerated  2. Weekly weights  3. Consider daily multivitamin     Monitor:  1. % PO intake  2. Diet tolerance  3. Weight changes  4. Labs-replete prn  5. Skin integrity  6. Bowel function    Goals:         1. To consume and tolerate >/= 75% of estimated needs via goal diet and supplements      Clinical Course: Patient is a 45 y.o. male who was admitted on 3/2/2021 with a diagnosis of Osteoarthritis of left hip, unspecified osteoarthritis type [M16.12]  OA (osteoarthritis) of hip [M16.9]. S/p hip arthroplasty Patient is off the floor when visited. Discussed in rounds, plan for d/c tomorrow.     Nutrition Focused Physical Exam:  BMI indicates overweight status         Dietary Orders   (From admission, onward)             Start     Ordered    03/02/21 1114  Adult Diet Regular; RD/LDN may adjust order  Diet effective now     Question Answer Comment   Diet Texture Regular    Delegation of Authority. Diet orders written by PA/CRNPs may not be adjusted by RD/LDNs. RD/LDN may adjust order        03/02/21 1113              Weights (last 7 days)     Date/Time   Weight    03/02/21 0640   95.7 kg (211 lb)            Wt Readings from Last 3 Encounters:   03/02/21 95.7 kg (211 lb)   02/25/21 96.8 kg (213 lb 8 oz)   02/25/21 97.5 kg (215 lb)       Reason for Assessment  Reason For Assessment: per organizational policy([facility])  Diagnosis: (hip osteo, s/p hip arthroplasty)    [facility] Nutrition Screen Tool  Has patient lost weight without trying?: 0-->Yes  If yes,how much weight has been lost?: 4-->34 or more pounds  Has patient been eating poorly due to decreased appetite?: 1-->Yes  [facility] Nutrition Screen Score: 5    Nutrition/Diet History  Intake (%): 75%    Physical Findings  Last Bowel Movement: 03/02/21  Skin: (intact, surgical incision hip)    Last Bowel Movement: 03/02/21         Anthropometrics  Height:  "180.3 cm (5' 11\")           Current Weight  Weight Method: Standing scale  Weight: 95.7 kg (211 lb)    Ideal Body Weight (IBW)  Ideal Body Weight (IBW) (kg): 79.27  % Ideal Body Weight: 120.73       Body Mass Index (BMI)  BMI (Calculated): 29.4  BMI (kg/m2): 29.49  BMI Assessment: BMI 25-29.9: overweight                        Labs/Procedures/Meds  Lab Results Reviewed: reviewed    Results from last 7 days   Lab Units 03/03/21  0512 02/25/21  1357   SODIUM mEQ/L 139 139   POTASSIUM mEQ/L 3.7 3.9   CHLORIDE mEQ/L 107 103   CO2 mEQ/L 25 26   BUN mg/dL 16 15   CREATININE mg/dL 0.7* 1.1   GLUCOSE mg/dL 111* 93   CALCIUM mg/dL 8.7* 9.5               No results found for: HGBA1C  No results found for: [id number]  Lab Results   Component Value Date    CALCIUM 8.7 (L) 03/03/2021     Results from last 7 days   Lab Units 03/03/21  0512 02/25/21  1356   WBC K/uL 7.04 6.89   HEMOGLOBIN g/dL 12.7* 14.1   HEMATOCRIT % 38.6* 43.7   PLATELETS K/uL 237 241                      Diagnostic Tests/Procedures  Diagnostic Test/Procedure Reviewed: reviewed    Medications  Pertinent Medications Reviewed: reviewed  Pertinent Medications Comments: tylenol, lipitor, wellbutrin, tivicay, dscovy,   • acetaminophen  650 mg oral q6h INT   • aspirin  325 mg oral BID   • atorvastatin  40 mg oral Daily (6p)   • buPROPion  100 mg oral Daily   • buPROPion  75 mg oral Nightly   • dolutegravir  50 mg oral q AM   • emtricitabine-tenofovir alafenamide  1 tablet oral q AM   • ketorolac  15 mg intravenous q6h SALO   • lamoTRIgine  200 mg oral Daily   • oxyCODONE  20 mg oral q8h SALO   • pantoprazole  40 mg oral Daily before breakfast   • sennosides-docusate sodium  1 tablet oral BID         Nutritional Needs Met?: Yes  Nutrition Status Classification: Mild nutritional compromise        Date: 03/03/21  Signature: Augustina Diaz RD, LDN  "

## 2021-03-03 NOTE — PROGRESS NOTES
Patient: Sam Fitzgerald Chevalier  Location: [place] [address]  MRN: [id number]  Today's date: 3/3/2021  Pt returned to room by transport RN notified    Marco A is a 45 y.o. male admitted on 3/2/2021 with OA (osteoarthritis) of hip.     Past Medical History  Marco A has a past medical history of Anemia, Bipolar 1 disorder (CMS/[facility]), Deep vein thrombosis (CMS/[facility]) (2009), Depression, HIV disease (CMS/[facility]), Pancreatitis, and Sleep apnea (2004).    History of Present Illness   s/p L MIGUEL 3/2/21 Dr. Rick, no hip prec's noted, WBAT     PT Vitals    Date/Time Pulse HR Source SpO2 Pt Activity O2 Therapy BP BP Location BP Method Pt Position [facility]   03/03/21 0818 77 -- 94 % At rest None (Room air) 111/60 -- -- -- EMP   03/03/21 0844 76 Monitor 94 % At rest None (Room air) 111/60 Right upper arm Automatic Sitting CB      PT Pain    Date/Time Pain Type Side Location Rating: Rest Rating: Activity Interventions [facility]   03/03/21 0818 Pain Assessment Left hip 5 5 position adjusted EMP          Prior Living Environment      Most Recent Value   Living Arrangements  house   Living Environment Comment  Lives alone in 3 story rowhouse with approx. 20-30 SUSIE. steps are descending with R railing. once enters has OK on main floor, 10-12 carpeted stairs to 2nd floor with bathroom and full bathroom on 2nd floor. has a raised toilet seat with handles for upstair, tub with s/c          Prior Level of Function      Most Recent Value   Dominant Hand  right   Ambulation  independent   Transferring  independent   Toileting  independent   Bathing  independent   Dressing  independent   Eating  independent   Prior Level of Function Comment  Pt. independent in ADL and IADL performance without the use of AE. Pt works full time and is a +    Assistive Device/Animal Currently Used at Home  other (see comments), none [has cane]          PT Evaluation and Treatment - 03/03/21 0818        Time Calculation    Start Time  0818     Stop Time  0848      Time Calculation (min)  30 min        Session Details    Document Type  daily treatment/progress note     Mode of Treatment  physical therapy        General Information    Patient Profile Reviewed?  yes     Onset of Illness/Injury or Date of Surgery  03/02/21     Referring Physician  Prabhjot     General Observations of Patient  received pt supine in bed     Existing Precautions/Restrictions  fall;weight bearing        Weight-Bearing Status    Left LE Weight-Bearing Status  weight-bearing as tolerated (WBAT)        Cognition/Psychosocial    Affect/Mental Status (Cognitive)  WFL;anxious     Orientation Status (Cognition)  oriented x 4     Follows Commands (Cognition)  WFL     Cognitive Function (Cognitive)  WFL        Sensory    Hearing Status  WFL        Vision Assessment/Intervention    Visual Impairment/Limitations  WFL        Sensory Assessment (Somatosensory)    Sensory Assessment (Somatosensory)  sensation intact        Range of Motion (ROM)    Hip, Left (ROM)  0-72        Strength (Manual Muscle Testing)    Hip, Left (Strength)  3/5     Knee, Left (Strength)  3+/5        Edema Symptoms/Interventions    Description (Edema)  pitting     Pitting Scale (Edema)  1-->trace     Associated Symptoms (Edema)  pain     Treatment Interventions (Edema)  elevation        Bed Mobility    Bed Mobility  bed mobility (all) activities     [place], All Bed Mobility Activities  modified independence        Sit to Stand Transfer    [place], Sit to Stand Transfer  modified independence     Assistive Device  walker, front-wheeled        Stand to Sit Transfer    [place], Stand to Sit Transfer  modified independence     Assistive Device  walker, front-wheeled        Gait Training    [place], Gait  modified independence     Assistive Device  walker, front-wheeled     Distance in Feet  200 feet     Gait Pattern Utilized  step-through     Deviations/Abnormal Patterns (Gait)  antalgic;gait speed decreased     Maintains  Weight-Bearing Status  able to maintain     Advanced Gait Activity  10 Meter Walk Test (Self-Selected Velocity)        10 Meter Walk Test (Self-Selected Velocity)    Trial One: Ten Meter Walk Test (sec)  10.2 seconds     Trial Two: Ten Meter Walk Test (sec)  11.3 seconds     Trial One: Gait Speed (m/s)  0.59 m/s     Trial Two: Gait Speed (m/s)  0.53 m/s     Average Gait Speed (m/s): Two Trials  0.56 m/s        Stairs Training    [place], Stairs  modified independence     Assistive Device  railing;cane, straight     Handrail Location  right side (ascending)     Number of Stairs  12     Ascending Stairs Technique  step-to-step     Descending Stairs Technique  step-to-step     Maintains Weight-Bearing Status (Stairs)  able to maintain     Comment  indep car transfer        Balance    Static Sitting Balance  WFL     Dynamic Sitting Balance  WFL     Sit to Stand Dynamic Balance  WFL     Static Standing Balance  WFL     Dynamic Standing Balance  WFL        AM-PAC (TM) - Mobility (Current Function)    Turning from your back to your side while in a flat bed without using bedrails?  4 - None     Moving from lying on your back to sitting on the side of a flat bed without using bedrails?  4 - None     Moving to and from a bed to a chair?  4 - None     Standing up from a chair using your arms?  4 - None     To walk in a hospital room?  4 - None     Climbing 3-5 steps with a railing?  4 - None     AM-PAC (TM) Mobility Score  24        Therapy Assessment/Plan (PT)    PT Diagnosis  L MIGUEL     Patient/Family Therapy Goals Statement (PT)  to go home        Progress Summary (PT)    Daily Outcome Statement (PT)  all goals met cleared for d/c to home        Therapy Plan Review/Discharge Plan (PT)    PT Recommended Discharge Disposition  home     Anticipated Equipment Needs at Discharge (PT Eval)  commode, 3-in-1;walker, front-wheeled    issued    Therapy Plan Review (PT)  evaluation/treatment results reviewed;care plan/treatment  goals reviewed;patient;participants voiced agreement with care plan        Plan of Care Review    Plan of Care Reviewed With  patient                  Equipment Provided: Commode, 3-in-1 Folding , Walker, with Wheels, Adult     Education provided this session. See the Patient Education summary report for full details.    PT Goals      Most Recent Value   Bed Mobility Goal 1   Activity/Assistive Device  bed mobility activities, all at 03/02/2021 1528   [place]  modified independence, independent at 03/02/2021 1528   Time Frame  by discharge at 03/02/2021 1528   Progress/Outcome  goal met at 03/03/2021 0818   Transfer Goal 1   Activity/Assistive Device  sit-to-stand/stand-to-sit, car transfer, walker, front-wheeled at 03/02/2021 1528   [place]  modified independence at 03/02/2021 1528   Time Frame  by discharge at 03/02/2021 1528   Progress/Outcome  goal met at 03/03/2021 0818   Gait Training Goal 1   Activity/Assistive Device  walker, front-wheeled, diminish gait deviation, increase endurance/gait distance at 03/02/2021 1528   [place]  modified independence at 03/02/2021 1528   Distance  150 at 03/02/2021 1528   Time Frame  by discharge at 03/02/2021 1528   Progress/Outcome  goal met at 03/03/2021 0818   Stairs Goal 1   Activity/Assistive Device  using handrail, right, cane, straight, step-to-step, decrease fall risk at 03/02/2021 1528   [place]  modified independence at 03/02/2021 1528   Number of Stairs  12 at 03/02/2021 1528   Time Frame  by discharge at 03/02/2021 1528   Progress/Outcome  goal met at 03/03/2021 0818

## 2021-03-03 NOTE — PLAN OF CARE
Problem: Adult Inpatient Plan of Care  Goal: Readiness for Transition of Care  Intervention: Mutually Develop Transition Plan  Flowsheets (Taken 3/3/2021 0293)  Anticipated Discharge Disposition: home without services  Equipment Needed After Discharge:   walker, front-wheeled   commode, 3-in-1  Discharge Coordination/Progress: See CM Note  Assistive Device/Animal Currently Used at Home: (has cane)   other (see comments)   none  Transportation Concerns: car, none  Readmission Within the Last 30 Days: no previous admission in last 30 days  Patient/Family Anticipated Services at Transition: none  Patient/Family Anticipates Transition to: home  Transportation Anticipated: family or friend will provide  Concerns to be Addressed:   no discharge needs identified   denies needs/concerns at this time    Case Management Note: Met with patient in the room, introduced self, verified ID, demographic info, and PCP.  Patient masked and social distancing maintained  PCP: Dr. Beck  Pharmacy: [facility]    Housing: Patient lives alone in a 2 story home with 30 steps to enter: 1/2 bath on first floor. Patient stated that he will have friends that will be stopping in to check on him as needed.     PLOF:  Patient is independent with ADL's.      DME: uses none; has cane  Will need to obtain walker from PT and commode from OT.    Anticipated Disposition:  Anticipate discharge to home without services.  No skilled needs identified at this time.     Will continue to follow.    Plan of care discussed with patient; he will have a ride home.

## 2021-03-03 NOTE — PROGRESS NOTES
Inpatient Cardiology   Daily Progress Note       Overview: S/p L THR with Dr. Rick on 3/2/21     Assessment/Plan   DJD (degenerative joint disease)  Assessment & Plan  - DVT prophylaxis as per ortho service  - Pain management as per neurology   - Pulmonary toilet  - Ambulate  - Hold all natural supplements. Resume post op when ok to do so per ortho     Hyperlipidemia  Assessment & Plan  - Daily statin    Asymptomatic HIV infection (CMS/[facility])  Assessment & Plan  - Tivicay and Descovy are non-formulary-- patient may take his own medications that he brought with him    Chronic pancreatitis (CMS/[facility])  Assessment & Plan  - Follows with GI and pain management as outpatient -- follow-up as previously scheduled   - Pain managment as per ortho    Bipolar 1 disorder (CMS/[facility])  Assessment & Plan  - Continue Lamictal and Wellbutrin  - Reports he does not take Seroquel frequently   - Benzos per ortho    DVT (deep venous thrombosis) (CMS/[facility])  Assessment & Plan  - Resume eliquis when cleared with ortho service. Per notes, to be resumed following 7 days of Aspirin BID  - DVT prophylaxis as per ortho    HARPER (obstructive sleep apnea)  Assessment & Plan  - H/o, does not wear CPAP at night following weight loss     - EtC02 monitoring per protocol   - Monitor for signs or symptoms of hypercapnea   - Aggressive pulmonary toilet  - GERD/aspiration precautions           Subjective  OOB in chair, reports increased pain with PT. Denies chest pain, shortness of breath, palpitations, lightheadedness, N/V or HA. DTV this AM. + flatus       Current Medications:    Scheduled:  • acetaminophen  650 mg oral q6h INT   • aspirin  325 mg oral BID   • atorvastatin  40 mg oral Daily (6p)   • buPROPion  100 mg oral Daily   • buPROPion  75 mg oral Nightly   • dexamethasone  10 mg intravenous Once   • dolutegravir  50 mg oral q AM   • emtricitabine-tenofovir alafenamide  1 tablet oral q AM   • ketorolac  15 mg intravenous q6h SALO   • lamoTRIgine   200 mg oral Daily   • NON FORMULARY MEDICATION REQUEST  200 mg oral Daily   • NON FORMULARY MEDICATION REQUEST  50 mg oral Daily   • oxyCODONE  20 mg oral q8h SALO   • pantoprazole  40 mg oral Daily before breakfast   • sennosides-docusate sodium  1 tablet oral BID       Infusions:      PRN:  alum-mag hydroxide-simeth  •  clonazePAM  •  glucose **OR** dextrose **OR** glucagon **OR** dextrose in water  •  diphenhydrAMINE **OR** diphenhydrAMINE  •  ondansetron ODT **OR** ondansetron  •  oxyCODONE  •  polyethylene glycol  •  tiZANidine     Objective   Vital signs in last 24 hours:  Temp:  [36.3 °C (97.4 °F)-37 °C (98.6 °F)] 36.6 °C (97.9 °F)  Heart Rate:  [58-90] 75  Resp:  [8-55] 18  BP: ()/(59-82) 106/64    Wt Readings from Last 3 Encounters:   03/02/21 95.7 kg (211 lb)   02/25/21 96.8 kg (213 lb 8 oz)   02/25/21 97.5 kg (215 lb)       Physical Exam   Constitutional: He appears well-developed and well-nourished. No distress.   HENT:   Head: Normocephalic and atraumatic.   Eyes: EOM are normal. No scleral icterus.   Neck: Neck supple. No JVD present.   Cardiovascular: Normal rate and regular rhythm.   No murmur heard.  Pulses:       Dorsalis pedis pulses are 2+ on the right side and 2+ on the left side.   Pulmonary/Chest: Effort normal. No respiratory distress. He has no wheezes. He has no rales. He exhibits no tenderness.   Abdominal: Soft. Bowel sounds are normal. He exhibits no distension. There is no abdominal tenderness. There is no rebound and no guarding.   Musculoskeletal:         General: No edema.      Comments: L hip dressing not well visualized    Neurological: He is alert.   Skin: Skin is warm and dry.   Psychiatric: He has a normal mood and affect.   Vitals reviewed.       Labs and Data:      Hematology  Lab Results   Component Value Date    WBC 7.04 03/03/2021    HGB 12.7 (L) 03/03/2021    HCT 38.6 (L) 03/03/2021     03/03/2021    INR 1.0 03/02/2021       Chemistries  Lab Results   Component  Value Date     03/03/2021    K 3.7 03/03/2021     03/03/2021    CREATININE 0.7 (L) 03/03/2021    BUN 16 03/03/2021    CO2 25 03/03/2021    GLUCOSE 111 (H) 03/03/2021    CALCIUM 8.7 (L) 03/03/2021      Radiology:  I have independently reviewed the pertinent imaging from the last 24 hrs.    X-ray Hip With Or Without Pelvis 1 Vw Left    Result Date: 3/2/2021  IMPRESSION: Status post left hip arthroplasty.                      GABY Camara  3/3/2021

## 2021-03-03 NOTE — PLAN OF CARE
Problem: Adult Inpatient Plan of Care  Goal: Plan of Care Review  Outcome: Progressing  Flowsheets (Taken 3/3/2021 0806)  Progress: improving  Plan of Care Reviewed With: patient  Outcome Summary: Pt Vss overnight. Pain controlled with alternataing PRN orders for pain. OOB x1 with walker. Barnes removed @ 1230. Safety precautions in place call bell within reach.

## 2021-03-03 NOTE — PROGRESS NOTES
Patient: Sam Fitzgerald Chevalier  Location: [place] [address]  MRN: [id number]  Today's date: 3/3/2021     Pt. seated in recliner in therapy gym at the end of the evaluation. Pt in NAD and handed off to PT to complete evaluation with PT staff.    Marco A is a 45 y.o. male admitted on 3/2/2021 with OA (osteoarthritis) of hip.     Past Medical History  Marco A has a past medical history of Anemia, Bipolar 1 disorder (CMS/[facility]), Deep vein thrombosis (CMS/[facility]) (2009), Depression, HIV disease (CMS/[facility]), Pancreatitis, and Sleep apnea (2004).    History of Present Illness   s/p L MIGUEL 3/2/21 Dr. Rick, no hip prec's noted, WBAT     OT Vitals    Date/Time Pulse HR Source SpO2 Pt Activity O2 Therapy BP BP Location BP Method Pt Position [facility]   03/03/21 0815 81 Monitor 98 % At rest None (Room air) 106/85 Right upper arm Automatic Sitting CB   03/03/21 0818 77 -- 94 % At rest None (Room air) 111/60 -- -- -- EMP   03/03/21 0844 76 Monitor 94 % At rest None (Room air) 111/60 Right upper arm Automatic Sitting CB      OT Pain    Date/Time Pain Type Pref Pain Scale Side Location Rating: Rest Rating: Activity Interventions [facility]   03/03/21 0815 Pain Assessment number (Numeric Rating Pain Scale) -- -- 5 -- -- CB   03/03/21 0818 Pain Assessment -- Left hip 5 5 position adjusted EMP          Prior Living Environment      Most Recent Value   Living Arrangements  house   Living Environment Comment  Lives alone in 3 story rowhouse with approx. 20-30 SUSIE. steps are descending with R railing. once enters has MO on main floor, 10-12 carpeted stairs to 2nd floor with bathroom and full bathroom on 2nd floor. has a raised toilet seat with handles for upstair, tub with s/c          Prior Level of Function      Most Recent Value   Dominant Hand  right   Ambulation  independent   Transferring  independent   Toileting  independent   Bathing  independent   Dressing  independent   Eating  independent   Prior Level of Function Comment  Pt.  independent in ADL and IADL performance without the use of AE. Pt works full time and is a +    Assistive Device/Animal Currently Used at Home  other (see comments), none [has cane]          Occupational Profile      Most Recent Value   Patient Goals  to go home          OT Evaluation and Treatment - 03/03/21 0806        Time Calculation    Start Time  0806     Stop Time  0845     Time Calculation (min)  39 min        Session Details    Document Type  initial evaluation     Mode of Treatment  occupational therapy        General Information    Patient Profile Reviewed?  yes     Onset of Illness/Injury or Date of Surgery  03/02/21    L MIGUEL    General Observations of Patient  Pt. received in therapy gym for OT evaluation. Pt. seated in recliner. Pt. alert, pleasant, and cooperative with treatment. Pt. eager to work with therapy services to get questions answered     Existing Precautions/Restrictions  weight bearing        Weight-Bearing Status    Left LE Weight-Bearing Status  weight-bearing as tolerated (WBAT)        Cognition/Psychosocial    Affect/Mental Status (Cognitive)  WFL;anxious     Orientation Status (Cognition)  oriented x 4     Follows Commands (Cognition)  WFL     Cognitive Function (Cognitive)  WFL        Vision Assessment/Intervention    Visual Impairment/Limitations  WFL        Sensory Assessment (Somatosensory)    Sensory Assessment (Somatosensory)  sensation intact;bilateral UE        Range of Motion (ROM)    Range of Motion  ROM is WFL;bilateral upper extremities        Strength (Manual Muscle Testing)    Strength (Manual Muscle Testing)  strength is WFL;bilateral upper extremities        Transfers    Transfers  toilet transfer;tub transfer        Sit to Stand Transfer    [place], Sit to Stand Transfer  modified independence;verbal cues     Verbal Cues  hand placement;safety;technique     Assistive Device  walker, front-wheeled        Stand to Sit Transfer    [place], Stand to Sit  Transfer  modified independence;verbal cues     Verbal Cues  hand placement;technique;safety     Assistive Device  walker, front-wheeled        Toilet Transfer    Transfer Technique  stand-sit;sit-stand     [place], Toilet Transfer  modified independence     Assistive Device  commode, 3-in-1;walker, front-wheeled     Comment  recommended commode for [place]    has a raised toilet seat with handles for upstairs bathroom       Tub Transfer    Transfer Technique  sit and swing     [place], Tub Transfer  modified independence     Assistive Device  shower chair;walker, front-wheeled        Safety Issues, Functional Mobility    Impairments Affecting Function (Mobility)  pain;range of motion;strength;endurance/activity tolerance        Balance    Balance Assessment  sitting static balance;sitting dynamic balance;sit to stand dynamic balance;standing static balance;standing dynamic balance     Static Sitting Balance  WFL;sitting, edge of bed     Dynamic Sitting Balance  WFL;sitting, edge of bed     Sit to Stand Dynamic Balance  WFL;supported     Static Standing Balance  WFL;supported     Dynamic Standing Balance  WFL;supported     Balance Test Results  Functional Reach Test        Functional Reach Test    Trial One: Functional Reach Test (in)  13 inches     Trial Two: Functional Reach Test (in)  14 inches     Average (in)  13.5 inches        Upper Body Dressing    Self-Performance  threads right arm, shirt;threads left arm, shirt;pulls shirt over head/around back;pulls shirt down/adjusts     [place]  independent     Position  edge of bed sitting     Adaptive Equipment  none        Lower Body Dressing    Self-Performance  threads left leg, underpants;threads right leg, underpants;threads left leg, pants/shorts;threads right leg, pants/shorts;pulls underpants up or down;pulls pants/shorts up or down     [place]  modified independence     Position  edge of bed sitting     Adaptive Equipment  none        AM-PAC  (TM) - ADL (Current Function)    Putting on and taking off regular lower body clothing?  4 - None     Bathing?  4 - None     Toileting?  4 - None     Putting on/taking off regular upper body clothing?  4 - None     How much help for taking care of personal grooming?  4 - None     Eating meals?  4 - None     AM-PAC (TM) ADL Score  24        Therapy Assessment/Plan (OT)    Therapy Frequency (OT)  evaluation only        Progress Summary (OT)    Daily Outcome Statement (OT)  Pt. seen in therapy gym for OT evaluation. Pt. presents with good balance and safety awareness. Pt. able to complete functional transfers with Mod I, UBD independently and LBD with Mod I. Pt. reports that friends will be stopping by to assist as needed. Pt. is cleared from OT as services are not indicated at this time.     Symptoms Noted During/After Treatment  none        Therapy Plan Review/Discharge Plan (OT)    OT Recommended Discharge Disposition  home;home with assist     Anticipated Equipment Needs At Discharge (OT)  commode, 3-in-1                   Education provided this session. See the Patient Education summary report for full details.

## 2021-03-03 NOTE — PROGRESS NOTES
Late entry from this am:  Patient c/o surgical site discomfort    VSS: AAO; NAD; VÁZQUEZ   aquacel dsg CDI  LLE DF/PF/EHL intact, SILT, compartments soft & compressible, +DP  scds intact    AP/ 45YOM s/p L MIGUEL -POD#1; History includes chronic pain    · Continue pain management-> Dr Wynne in process of medication titration (see MD note and orders)   · PT/OT/WBAT  · dvt ppx: asa 325mg BID x 7 days postop; then resume home eliquis  (also, scds when in bed and mobilize as tolerated)  · Medical/cardiac management per [facility]  · See orders  · Dispo: Likely discharge home tomorrow (narcotic medication regimen titration in progress today) , as long as cleared by PT/OT/[facility] and Dr Wynne

## 2021-03-03 NOTE — PLAN OF CARE
Problem: Adult Inpatient Plan of Care  Goal: Plan of Care Review  Outcome: Progressing  Flowsheets (Taken 3/3/2021 1217)  Plan of Care Reviewed With: patient  Outcome Summary: Pt. seen in therapy gym for OT evaluation. Pt. presents with good balance and safety awareness. Pt. able to complete functional transfers with Mod I, UBD independently and LBD with Mod I. Pt. reports that friends will be stopping by to assist as needed. Pt. is cleared from OT as services are not indicated at this time.     Problem: Adult Inpatient Plan of Care  Goal: Patient-Specific Goal (Individualization)  Outcome: Progressing  Flowsheets (Taken 3/3/2021 1217)  Patient-Specific Goals (Include Timeframe): to go home

## 2021-03-03 NOTE — CONSULTS
Neurology Consult Note    Subjective     Sam Fitzgerald Chevalier is a 45 y.o. male who was admitted for elective left hip replacement    Review of Systems      Allergies: Patient has no known allergies.    Current Inpatient Medications   Medication Dose Route Frequency Provider Last Rate Last Admin   • acetaminophen (TYLENOL) tablet 650 mg  650 mg oral q6h INT Daiana Villa CRNP   650 mg at 03/03/21 0643   • alum-mag hydroxide-simeth (MAALOX) 200-200-20 mg/5 mL suspension 30 mL  30 mL oral q4h PRN Daiana Villa CRNP       • aspirin EC tablet 325 mg  325 mg oral BID VillaDaiana CRNP   325 mg at 03/02/21 2016   • atorvastatin (LIPITOR) tablet 40 mg  40 mg oral Daily (6p) Daiana Villa CRNP   40 mg at 03/02/21 1751   • buPROPion (WELLBUTRIN) tablet 100 mg  100 mg oral Daily VillaDaiana CRNP       • buPROPion (WELLBUTRIN) tablet 75 mg  75 mg oral Nightly Daiana Villa CRNP       • clonazePAM (klonoPIN) tablet 0.5 mg  0.5 mg oral 2x daily PRN Daiana Villa CRNP   0.5 mg at 03/02/21 1747   • dexamethasone (DECADRON) injection 10 mg  10 mg intravenous Once Daiana Villa CRNP       • glucose chewable tablet 16-32 g of dextrose  16-32 g of dextrose oral PRN Daiana Villa CRNP        Or   • dextrose 40 % oral gel 15-30 g of dextrose  15-30 g of dextrose oral PRN Daiana Villa CRNP        Or   • glucagon (GLUCAGEN) injection 1 mg  1 mg intramuscular PRN Daiana Villa CRNP        Or   • dextrose in water injection 12.5 g  25 mL intravenous PRN Daiana Villa CRNP       • diphenhydrAMINE (BENADRYL) capsule 25 mg  25 mg oral q6h PRN Daiana Villa CRNP        Or   • diphenhydrAMINE (BENADRYL) injection 25 mg  25 mg intravenous q6h PRN Daiana Villa CRNP       • dolutegravir (TIVICAY) tablet 50 mg  50 mg oral q AM Daiana Villa CRNP       • emtricitabine-tenofovir alafenamide (DESCOVY) 200-25 mg tablet 1 tablet  1 tablet oral q AM Daiana Villa CRNP       • HYDROmorphone (DILAUDID) 1 mg/mL injection 1 mg  1 mg intravenous q3h PRN Leta Cochran  MD CARLTON   1 mg at 03/03/21 0454   • ketorolac (TORADOL) injection 15 mg  15 mg intravenous q6h SALO Daiana Villa CRNP   15 mg at 03/03/21 0644   • lamoTRIgine (LaMICtal) tablet 200 mg  200 mg oral Daily Daiana Villa CRNP       • morphine (MSIR) immediate release tablet 15 mg  15 mg oral Daily Daiana Villa CRNP   15 mg at 03/02/21 1515   • NON FORMULARY MEDICATION REQUEST  200 mg oral Daily Daiana Villa CRNP       • NON FORMULARY MEDICATION REQUEST  50 mg oral Daily Daiana Villa CRNP       • ondansetron ODT (ZOFRAN-ODT) disintegrating tablet 4 mg  4 mg oral q8h PRN Daiana Villa CRNP   4 mg at 03/03/21 0029    Or   • ondansetron (ZOFRAN) injection 4 mg  4 mg intravenous q8h PRN Daiana Villa CRNP       • oxyCODONE (ROXICODONE) immediate release tablet 15-20 mg  15-20 mg oral q4h PRN Leta Cochran MD   15 mg at 03/03/21 0647   • pantoprazole (PROTONIX) tablet,delayed release (DR/EC) 40 mg  40 mg oral Daily before breakfast Daiana Villa CRNP   40 mg at 03/03/21 0642   • polyethylene glycol (MIRALAX) 17 gram packet 17 g  17 g oral Nightly PRN Daiana Villa CRNP       • sennosides-docusate sodium (SENOKOT-S) 8.6-50 mg per tablet 1 tablet  1 tablet oral BID Daiana Villa CRNP   1 tablet at 03/02/21 2016   • tiZANidine (ZANAFLEX) tablet 4 mg  4 mg oral q6h PRN Daiana Villa CRNP   4 mg at 03/03/21 0644       Medical History:   Past Medical History:   Diagnosis Date   • Anemia     last transfusion 2017   • Bipolar 1 disorder (CMS/HCC)    • Deep vein thrombosis (CMS/HCC) 2009    last clot 2015 PE   • Depression    • HIV disease (CMS/HCC)    • Pancreatitis    • Sleep apnea 2004    lost 150lb  no cpap currently       Surgical History:   Past Surgical History:   Procedure Laterality Date   • ABDOMINOPLASTY  2006   • DENTAL SURGERY  2020    extractions     • GASTRIC BYPASS  2004       Social History:   Social History     Socioeconomic History   • Marital status: Single     Spouse name: None   • Number of children: None   • Years of  education: None   • Highest education level: None   Occupational History   • None   Social Needs   • Financial resource strain: None   • Food insecurity     Worry: None     Inability: None   • Transportation needs     Medical: None     Non-medical: None   Tobacco Use   • Smoking status: Former Smoker     Packs/day: 1.00     Years: 25.00     Pack years: 25.00     Quit date: 2021     Years since quittin.1   • Smokeless tobacco: Never Used   Substance and Sexual Activity   • Alcohol use: Yes     Frequency: 2-4 times a month   • Drug use: Not Currently     Types: Heroin, Codeine, IV     Comment: no longer uses heroin or cocaine     • Sexual activity: Defer   Lifestyle   • Physical activity     Days per week: None     Minutes per session: None   • Stress: None   Relationships   • Social connections     Talks on phone: None     Gets together: None     Attends Judaism service: None     Active member of club or organization: None     Attends meetings of clubs or organizations: None     Relationship status: None   • Intimate partner violence     Fear of current or ex partner: None     Emotionally abused: None     Physically abused: None     Forced sexual activity: None   Other Topics Concern   • None   Social History Narrative   • None       Family History:   Family History   Adopted: Yes       Objective     Physical Exam  Awake, alert, speech intact  Facies symmetric  Full fields  EOMI, ROBIN  Moving extremities, limited by mild left hip pain      Labs  reviewed        Assessment           Pain Management  - s/p left hip replacement, POD #1  - out-pt regimen: MSIR 15mg daily; compliant per PDMP   - d/c Dilaudid, MSIR  - start Oxycodone 20mg q 8hrs, OxyIR 5mg TID prn as rescue dose  - continue Zanaflex prn  - call Dr. Wynne for all analgesic needs [phone]  - reviewed w/ nursing      CARLA WYNNE DO, PhD  3/3/21  7:13AM

## 2021-03-03 NOTE — OP NOTE
[facility]   OPERATIVE REPORT      PATIENT NAME:  SAM CHEVALIER  MR#:    [id number]  VISIT #:   [id number]  :    1975  ADMIT DATE:   3/2/2021  PROCEDURE DATE: 3/2/2021  ROOM NUMBER:    OPERATION: TOTAL HIP ARTHROPLASTY  PREOPERATIVE DIAGNOSIS: Primary osteoarthritis left hip  POSTOPERATIVE DIAGNOSIS: Primary osteoarthritis left hip  ANESTHESIA: Spinal   SURGEON: Pradeep Rick M.D.     ASSISTANT:  Nataliia Flannery NP     EBL:   Specimens: If required by hospital rules, resected tissue was sent for routine analysis.     PROCEDURE:  After the induction of anesthesia, the patient was routinely prepped and draped for total hip arthroplasty.  Dr Pradeep Rick performed and/or supervised the key portions of the surgical procedure including bone preparation, insertion of the provisional and final components and assessment of range of motion, joint stability, and leg length.  Following draping, the left hip was exposed and the hip was dislocated.  Severe joint deterioration was noted. The acetabulum was prepared to accept a 58 acetabular shell.  The shell was impacted into place and a firm press fit was obtained.  A 36 liner was then inserted into the shell.  The femoral canal was prepared to accept a 7 lateral femoral stem.  Trial reduction was performed.  The final stem was impacted into place and firm fixation was obtained.  Again, trial reductions were performed.  A +4 ceramic femoral head was impacted onto the femoral stem and a final reduction was performed.  After trial and final reduction, the hip was checked for leg length, stability, and range of motion.  After final reduction, the wound was  closed in layers and a sterile dressing was applied.      Corentec M Stem Orthopaedic components were utilized for this procedure.     I performed and/or supervised the key portions of the surgical procedure including an evaluation of the bone cuts, reshaping of the bony elements, insertion of  the provisional and final components, and  assessment of range of motion and stability    A surgical assistant was required during this procedure. This assistant facilitated the safe and competent performance of the surgical operation. The assistant functions included retraction, vascular coagulation, removal of resected bone elements, suction, wound irrigation, and wound closure. The presence of this assistant during the procedure was essential.     I certify that the services for which payment is claimed were medically necessary and that no qualified resident or fellow was available to perform the services.

## 2021-03-03 NOTE — PATIENT CARE CONFERENCE
Care Progression Rounds Note  Date: 3/3/2021  Time: 12:19 PM     Patient Name: Sam Fitzgerald Chevalier     Medical Record Number: [id number]   YOB: 1975  Sex: Male      Room/Bed: 4613    Admitting Diagnosis: Osteoarthritis of left hip, unspecified osteoarthritis type [M16.12]  OA (osteoarthritis) of hip [M16.9]   Admit Date/Time: 3/2/2021  5:56 AM    Primary Diagnosis: No Principal Problem: There is no principal problem currently on the Problem List. Please update the Problem List and refresh.  Principal Problem: No Principal Problem: There is no principal problem currently on the Problem List. Please update the Problem List and refresh.    GMLOS: pending  Anticipated Discharge Date: 3/4/2021    AM-PAC  Mobility Score: 24    Discharge Planning:  Living Arrangements: house  Anticipated Discharge Disposition: home without services    Barriers to Discharge:  Barriers to Discharge: Medical issues not resolved  Comment: cleared PT, d/c tomorrwo due to narcotic titration    Participants:  , dietitian/nutrition services, nursing, occupational therapy, physical therapy, social work/services

## 2021-03-04 VITALS
DIASTOLIC BLOOD PRESSURE: 62 MMHG | BODY MASS INDEX: 29.54 KG/M2 | HEIGHT: 71 IN | RESPIRATION RATE: 16 BRPM | WEIGHT: 211 LBS | SYSTOLIC BLOOD PRESSURE: 123 MMHG | OXYGEN SATURATION: 97 % | HEART RATE: 83 BPM | TEMPERATURE: 98.5 F

## 2021-03-04 LAB
ANION GAP SERPL CALC-SCNC: 7 MEQ/L (ref 3–15)
BUN SERPL-MCNC: 20 MG/DL (ref 8–20)
CALCIUM SERPL-MCNC: 8.6 MG/DL (ref 8.9–10.3)
CHLORIDE SERPL-SCNC: 107 MEQ/L (ref 98–109)
CO2 SERPL-SCNC: 24 MEQ/L (ref 22–32)
CREAT SERPL-MCNC: 0.8 MG/DL (ref 0.8–1.3)
ERYTHROCYTE [DISTWIDTH] IN BLOOD BY AUTOMATED COUNT: 14.3 % (ref 11.6–14.4)
GFR SERPL CREATININE-BSD FRML MDRD: >60 ML/MIN/1.73M*2
GLUCOSE SERPL-MCNC: 100 MG/DL (ref 70–99)
HCT VFR BLDCO AUTO: 34.2 % (ref 40.1–51)
HGB BLD-MCNC: 11.4 G/DL (ref 13.7–17.5)
MCH RBC QN AUTO: 30.8 PG (ref 28–33.2)
MCHC RBC AUTO-ENTMCNC: 33.3 G/DL (ref 32.2–36.5)
MCV RBC AUTO: 92.4 FL (ref 83–98)
PDW BLD AUTO: 10.1 FL (ref 9.4–12.4)
PLATELET # BLD AUTO: 237 K/UL (ref 150–350)
POTASSIUM SERPL-SCNC: 4.1 MEQ/L (ref 3.6–5.1)
RBC # BLD AUTO: 3.7 M/UL (ref 4.5–5.8)
SODIUM SERPL-SCNC: 138 MEQ/L (ref 136–144)
WBC # BLD AUTO: 7.44 K/UL (ref 3.8–10.5)

## 2021-03-04 PROCEDURE — 63600000 HC DRUGS/DETAIL CODE: Performed by: NURSE PRACTITIONER

## 2021-03-04 PROCEDURE — 85027 COMPLETE CBC AUTOMATED: CPT | Performed by: NURSE PRACTITIONER

## 2021-03-04 PROCEDURE — 63700000 HC SELF-ADMINISTRABLE DRUG: Performed by: NURSE PRACTITIONER

## 2021-03-04 PROCEDURE — 82435 ASSAY OF BLOOD CHLORIDE: CPT | Performed by: NURSE PRACTITIONER

## 2021-03-04 PROCEDURE — 99232 SBSQ HOSP IP/OBS MODERATE 35: CPT | Performed by: INTERNAL MEDICINE

## 2021-03-04 PROCEDURE — 63700000 HC SELF-ADMINISTRABLE DRUG: Performed by: PSYCHIATRY & NEUROLOGY

## 2021-03-04 PROCEDURE — 80048 BASIC METABOLIC PNL TOTAL CA: CPT | Performed by: NURSE PRACTITIONER

## 2021-03-04 RX ORDER — TIZANIDINE 4 MG/1
TABLET ORAL
Start: 2021-03-04 | End: 2021-03-04 | Stop reason: HOSPADM

## 2021-03-04 RX ORDER — ASPIRIN 325 MG
325 TABLET, DELAYED RELEASE (ENTERIC COATED) ORAL 2 TIMES DAILY
Start: 2021-03-04 | End: 2021-03-18 | Stop reason: ENTERED-IN-ERROR

## 2021-03-04 RX ORDER — CYCLOBENZAPRINE HCL 5 MG
5 TABLET ORAL NIGHTLY
Start: 2021-03-04

## 2021-03-04 RX ORDER — OXYCODONE HYDROCHLORIDE 5 MG/1
TABLET ORAL
Start: 2021-03-04 | End: 2021-03-22 | Stop reason: HOSPADM

## 2021-03-04 RX ADMIN — OXYCODONE 10 MG: 5 TABLET ORAL at 13:04

## 2021-03-04 RX ADMIN — OXYCODONE 10 MG: 5 TABLET ORAL at 08:49

## 2021-03-04 RX ADMIN — ASPIRIN 325 MG: 325 TABLET, COATED ORAL at 08:34

## 2021-03-04 RX ADMIN — KETOROLAC TROMETHAMINE 15 MG: 15 INJECTION, SOLUTION INTRAMUSCULAR; INTRAVENOUS at 06:47

## 2021-03-04 RX ADMIN — ACETAMINOPHEN 650 MG: 325 TABLET ORAL at 06:44

## 2021-03-04 RX ADMIN — OXYCODONE 10 MG: 5 TABLET ORAL at 04:31

## 2021-03-04 RX ADMIN — CLONAZEPAM 0.5 MG: 0.5 TABLET ORAL at 06:51

## 2021-03-04 RX ADMIN — TIZANIDINE 4 MG: 4 TABLET ORAL at 04:32

## 2021-03-04 RX ADMIN — PANTOPRAZOLE SODIUM 40 MG: 40 TABLET, DELAYED RELEASE ORAL at 06:45

## 2021-03-04 RX ADMIN — ONDANSETRON 4 MG: 2 INJECTION INTRAMUSCULAR; INTRAVENOUS at 08:34

## 2021-03-04 RX ADMIN — KETOROLAC TROMETHAMINE 15 MG: 15 INJECTION, SOLUTION INTRAMUSCULAR; INTRAVENOUS at 11:38

## 2021-03-04 RX ADMIN — OXYCODONE 5 MG: 5 TABLET ORAL at 06:45

## 2021-03-04 RX ADMIN — LAMOTRIGINE 200 MG: 100 TABLET ORAL at 08:34

## 2021-03-04 RX ADMIN — BUPROPION HYDROCHLORIDE 100 MG: 100 TABLET ORAL at 08:33

## 2021-03-04 RX ADMIN — OXYCODONE 5 MG: 5 TABLET ORAL at 10:58

## 2021-03-04 RX ADMIN — TIZANIDINE 4 MG: 4 TABLET ORAL at 10:58

## 2021-03-04 RX ADMIN — DOCUSATE SODIUM 50 MG AND SENNOSIDES 8.6 MG 1 TABLET: 8.6; 5 TABLET, FILM COATED ORAL at 08:33

## 2021-03-04 NOTE — DISCHARGE SUMMARY
(Addendum: Dr Wynne escribed narcotic regimen to the patients pharmacy for discharge-see MD note)  -------------------------------------------------------------------------  Orthopedic Discharge Summary    Attending Physician:  Pradeep Rick MD    Consulting Providers: Arnulfo Wynne MD; Kaci GRIFFIN    Admission Date: 3/2/2021     Discharge Date: 3/4/2021      Admitting Diagnosis  Primary osteoarthritis left hip    Discharge Diagnosis  Primary osteoarthritis left hip; s/p left total hip arthroplasty (3/2/21)    Hospital Course  This is a 45 y.o. male who underwent that above. The patient tolerated the procedure well without postoperative complications. The hospital course was unremarkable. Once the patient met the criteria for discharge, he was deemed suitable for discharge to home.     Past Medical History   Past Medical History:   Diagnosis Date   • Anemia     last transfusion 2017   • Bipolar 1 disorder (CMS/[facility])    • Deep vein thrombosis (CMS/[facility]) 2009    last clot 2015 PE   • Depression    • HIV disease (CMS/[facility])    • Pancreatitis    • Sleep apnea 2004    lost 150lb  no cpap currently       Past Surgical History   Past Surgical History:   Procedure Laterality Date   • ABDOMINOPLASTY  2006   • DENTAL SURGERY  2020    extractions     • GASTRIC BYPASS  2004       Allergies   No Known Allergies    Family/Social History    See H&P    Physical Exam  VSS; AAOx3; NAD; VÁZQUEZ  Normocephalic; anicteric; trachea midline; neck supple  Breathing even, unlabored; no SOB  Abdomen soft, NT,ND; tolerating diet well  No bladder distention; voiding without difficulty  Skin W/D; LH aquacel dressing CDI  LLE compartments soft, SILT, DF/PF/EHL intact, +DP/PT pulses intact    Final Impressions  This patient is stable for discharge to home. Discharge instructions were reviewed with the patient and incorporated into the discharge packet. This included the importance of scheduling a follow up office apt and calling in the  interim with any questions, problems or concerns. The patient stated understanding and was agreeable.    Discharge Medications- see MAR

## 2021-03-04 NOTE — NURSING NOTE
The patient was very concerned about the amount of pain pills he should have for D/C.  Dr Wynne was taking care of pain medication and pain management during the hospital and at the time of D/C. He handelled the authorization of the pain meds through his pharmacy. Several phone calls were placed to Dr Wynne and he returned phone calls immediately. He was very receptive and responsive to the patient as well as the nursing needs. The patient was agreeable to the pain regimen at the time of D/C

## 2021-03-04 NOTE — PLAN OF CARE
Problem: Adult Inpatient Plan of Care  Goal: Plan of Care Review  Outcome: Progressing  Flowsheets (Taken 3/4/2021 0301)  Progress: improving  Plan of Care Reviewed With: patient  Outcome Summary: Pt stable overnight. Pt able sleep after 0000 PRN dose of pain medications. Resting comfortably. Voiding without issues. Walked unit 2 times. Dressing CDI. Safety precations in place. Call bell within reach.     Problem: Adult Inpatient Plan of Care  Goal: Patient-Specific Goal (Individualization)  Outcome: Progressing  Flowsheets (Taken 3/4/2021 0301)  Patient-Specific Goals (Include Timeframe): To go home 3/4  Individualized Care Needs: Pain managment, neuro checks, OOB  Anxieties, Fears or Concerns: Pain control going home

## 2021-03-04 NOTE — DISCHARGE INSTRUCTIONS
POST-OPERATIVE INSTRUCTIONS -HIP REPLACEMENT    Surgeon:  Dr. Pradeep Rick Nurse:  Jennifer Monge    Phone: [phone]    [facility] Phone: [phone]   Use for emergencies (including after business hours and weekends)       Congratulations!  You've made a big step!  Our focus now is on your recovery and getting you back to a healthy, happy, pain-free lifestyle.  Every day, you will find you can do a bit more.  You are literally teaching yourself to walk normally again! Here are answers to some common questions:      1. ACTIVITY/REHABILITATION/PHYSICALTHERAPY (PT)  There is a great deal of healing that will take place during the next several months of your recovery as your bone literally grows into the new implant and your muscles heal.  Please do not overdo it!  Let your hip heal and it will likely give you a lifetime of pain relief. Your focus should simply be on walking and doing your normal activities of daily living (ADL).  You use all the muscles around your hip when you walk and when you're up doing things.  You do not need to set records, simply walk as much as you feel comfortable every day.  It is normal to have discomfort, but if you are having intense pain, stop the activity and rest.  You'll notice just about every day that you are feeling stronger and can walk further with less assistance. Don't be concerned about specific exercises, JUST WALK!!    The plan is for you to go home directly from the hospital (there may be an exception, but it is rare).  Most people will be able to go home in 1 or 2 days after surgery.  The timing of your discharge will be determined by how well you do with the hospital physical therapy.  We want to make sure that you are safe to go home.  If you can move around the hospital without assistance, it's unlikely that you'll need extra assistance when you get home.  Home care services; such as physical therapy and nursing are rarely required, even if  you live alone. Remember, we simply want you to be able to safely get around your home and WALK!      Inpatient rehabilitation is almost never required in this day and age. In the unusual situation that it is necessary, it will be arranged by the hospital .  Even at inpatient rehab, we just want you to focus on walking.     The vast majority of patients will NOT even need any formal outpatient PT!  However, if you are having any problems at home, please call us and we can discuss the option of a more formal PT program. We've learned that patients “taking it easy” for the first couple of weeks after surgery may have better outcomes. Doing intense PT right after surgery can cause pain and swelling.  If you avoid overdoing it, you will have less pain and swelling.    Every day you will feel stronger and more stable. You will use a walker at first and you should plan to advance to a cane sometime within a couple weeks after surgery - you can do this whenever you feel comfortable. Using the cane helps your muscles heal faster, so use the cane for about a month. Stay active, but don't overdo it - we want the tissues and bone to heal well before we start stressing the hip.  You can resume upper body exercises or start using a stationary bike (but not necessary!) after 2 weeks.  Remember: NO FORMAL Physical Therapy…Just WALK!          2. SWELLING   It is NORMAL for your leg to swell after surgery.  If you're having a lot of swelling, you must spend more time elevating your leg well above your heart (see image below).  You may need to elevate your leg on 4 or 5 pillows for 30-60 minutes at a time, 5 times a day.  If your leg swelling is not improving, please let us know. Some swelling can persist for weeks to months after the procedure.    This is How to Elevate Your Legs above Heart Level:            To Reduce Swelling and pain  Do this 30-60 mins at a time!  Do this 4 to 5 times a day!          3.  MEDICINES  Typically, you are given prescriptions at discharge from the hospital. Let us know if you have any allergies to these medicines.  Your nurse will review your medicines upon your discharge.    Pain Medicine: Typically you will be given a prescription for oxycodone, hydrocodone, or tramadol.  Take this as necessary, but wean off of it to Tylenol as soon as you feel comfortable.  You should notice that you need less narcotic pain medicine as time passes. Do NOT drive while you are taking narcotic pain medicines.  Stop taking the pain medicine if you become dizzy or disoriented.  Constipation is a major side effect of narcotics and many patients will avoid narcotics, as they prefer to deal with a bit of pain rather than constipation.  If you stop taking the pain medicine, you can typically stop taking the stool softener. Gradually weaning off the narcotic pain medicines may be safer than abruptly stopping them.     ****  DR IRVIN CALVO SENT PRESCRIPTIONS FOR POSTOP PAIN TO YOUR PHARMACY: TAKE AS DIRECTED BY DR CALVO:     Oxycodone 20mg every 8 hrs ATC and OxyIR 5mg every 8 hrs as needed for breakthru pain x 4 days; then,  Oxycodone 15mg every 8 hrs ATC and OxyIR 5mg every 8 hrs as needed for breakthru pain x 2 days; then,  Oxycodone 10mg every 8 hrs ATC and OxyIR 5mg every 8 hrs as needed for breakthru pain x 2 days; then,  Oxycodone   5mg every 8 hrs ATC and OxyIR 5mg every 12 hrs as needed for breakthru pain    Flexeril 5mg by mouth at bedtime    ****  CALL DR CALVO OR DR ELIAS IF YOU HAVE ANY QUESTIONS      Stool Softener: (usually Colace).  This helps to avoid constipation caused by the other medications. Take it 2 times per day for 2-4 weeks, or as long as you are taking the narcotics. It is fine to take over-the-counter laxatives in addition to Colace for constipation management.  If you are not constipated or you experience diarrhea, stop taking the stool softener!      Blood Thinners: Aspirin,  Coumadin (warfarin), or Lovenox (enoxaparin) - ONLY ONE - you should not be on more than one of these medicines.  These medicines will help to reduce the risk of blood clots.  However, if your blood becomes too “thin”, you may see bleeding (at the surgical site, gums, in urine, rectum, etc.), severe bruising, or stomach upset.  Please call the office if this occurs.  Do not take vitamin K, vitamin E, or supplements until seen in the office.  It is fine to take a multivitamin. Please remember that the most important way to reduce the risk of clot formation is to get up and get going!        ASPIRIN/ECOTRIN (nearly all patients):     If you are discharged with aspirin (either 81mg or 325mg), take it twice a day x 4 weeks.       COUMADIN (Warfarin):  If you are taking Coumadin, you must get a blood test (INR) drawn 2x/week (you'll have a prescription for this).  Your dose will be adjusted based on the lab values.  Stay on Coumadin for 4 weeks.  If you were taking Coumadin before surgery you may resume your normal dose at this time.  Typically the cardiologist or your medical doctor will follow your labs and adjust the Coumadin dose.  If you do not receive instruction from them or cannot get in touch with them, call our office.      LOVENOX (Enoxaparin):    This is an injection you will give to yourself for 14 days after surgery.  NOTE: If you notice any excessive bruising or bleeding from your surgical wound (or elsewhere), call our office.    Other Blood Thinners:  If you are taking other types of blood thinners such as Xarelto, Eliquis, Plavix, Aggrenox, Arixtra, Heparin, etc. please confirm your specific dosing and when to resume these medicines prior to your discharge.  Please DO NOT leave the hospital without a clear understanding of the instructions regarding blood thinners!    If you are discharged on low dose 81 mg Aspirin, you may resume Non-Steroidal Anti-Inflammatory Drugs (NSAID's) such as ibuprofen  (Motrin), naproxen (Aleve), etc. Wait at least 1 hour after aspirin dose before taking the NSAID. Please be careful when taking NSAID's and while taking aspirin because it can cause damage to your stomach or lead to bleeding. Do not take NSAID's if you are discharged on Coumadin, Lovenox, or other blood thinners. Call our office if you have questions.    ****DR ELIAS WOULD LIKE YOU TO TAKE ASPIRIN 325MG BY MOUTH TWICE DAILY. YOUR LAST DOSE OF ASPIRIN IS ON 3/9/21 IN PM. THEN, ON 3/10/21, SWITCH TO/RESUME HOME ELIQUIS      4. SURGICAL DRESSING/INCISION  A special dressing was placed over your wound at the time of surgery.  This has been shown to reduce the risk of infection. This protects your wound and can remain on for up to 1 week.  Blood spotting is normal, but should be contained in the padding of the dressing. You can shower without covering it. Remove the dressing 7 days after your surgery. It may be helpful to warm up the dressing by placing a warm towel over the dressing for 10 minutes prior to removal, or get in the shower and let warm water run over dressing.  This is done in an attempt to reduce the pull on your skin. Once removed, simply use soap and water to clean the incision daily and leave the incision open to air. Do not apply any lotions, creams, or ointments to your incision until fully closed and healed (4-6 weeks). Do not soak in a tub or swim until incision fully closed and healed (4-6 weeks). There usually are no staples or stitches that will need to be removed from your incision (if you have them, call for an appointment to remove at 2 weeks post op).  There are deep sutures under the skin that will dissolve over time.        5. SLEEPING   It is NORMAL to have difficulty sleeping after orthopaedic surgery.  It may take several months until you are sleeping normally. Elevating your leg throughout the day will reduce swelling that builds up at night - this can help you get a better night's  rest. We generally advise against taking sleeping medication postoperatively, unless you were doing so prior to the surgery.      6. SLEEPING POSITION   There are no restrictions regarding position.  When lying on your side, you may place a pillow between your knees for comfort, if necessary.       7. HIP PRECAUTIONS   'Listen' to your hip - don't push beyond a pulling or painful sensation.  Keep your body (trunk) between your legs when you bend. Be careful not to bend past 90 degrees.  Move smoothly during the first 4 -6 weeks after surgery.  Avoid any major twisting at the waist. Don't cross your legs at the knees for the first month after surgery. After one month, the chance of hip dislocation with activity is lower.      8. DRIVING  You must have advanced to a cane, be off narcotics, and feel comfortable and safe to drive! If it was your left hip, you may be ready after 1 week.  For the right hip, it usually takes longer; you may be ready as early as 3 weeks. You should drive only if you feel safe! If in doubt, call us.       9. INFECTION PRECAUTIONS FOR DENTAL APPOINTMENTS  You will need to take an antibiotic approximately one hour before any dental appointments. Call our office for a prescription if you have a dental appointment scheduled. Current recommendations are to continue to do this for the first 2 years after your surgery.      10. FLYING/TRIPS    By 6 weeks after surgery, you should be able to take just about any trip. Prior to that, it is likely that it will be uncomfortable, especially for longer trips, so we advise you to plan your travel accordingly.  For patients who come from far away, you can typically fly home within the first week. During any long trip (plane, train, auto), we suggest that you to take time to get up, stretch your legs, and walk around. Take a 325mg Aspirin on the morning and night of your trip (if not on other blood thinners). Special cards stating that you have a hip  replacement are no longer provided, as the TSA no longer accepts them.  Plan to spend some extra time at airport security in case you set off the alarm when you pass through the metal detector!      11. DISABILITY FORMS  Please contact our  if you have any disability, work, or other forms that need to be completed. Forms can be processed via the  at our [facility]. Form processing requires a signed release of information on file and form payment. Turnaround time for form completion is 7-10 days.      12. POST-OP APPOINTMENT: call [phone] if you do not have one scheduled.  Your appointment was made when you scheduled surgery. Routine post op visits are between 4- 6 weeks after your procedure.      13. RETURN TO WORK   I expect my patients to return to work within 3 months from surgery.  Most patients return around 4-6 weeks post op. You can return when you feel ready. You can return with restrictions (if your job allows you to do so).  Call my nurse Rodriguez if you need a note.         -----------------------------------------------------------------------------      Dr Rick Team Tips:    • Call my Nurse Jennifer if you have questions or concerns when you are home.  Please call at any time if you experience worsening pain, new numbness/tingling or weakness, wound redness or drainage, bleeding, loss of motion, fevers (temp above 101 F), or chills.  Never hesitate to call if you have a concern.    [phone] for Non-Emergent needs: (Mon-Fri 8:30am to 4:00pm)  [phone] for Emergency needs during or after business hours     • Pain medication refills- Call my nurse before you run out of medication (24 hours notice is preferred). Please keep track of how much medication you have left and call between 8:30 am and 3pm Monday through Friday. We do not prescribe medications after hours or on weekends.     We do not refill pain medication after 3 months from your surgery  date. To prevent narcotic dependency, we recommend you start decreasing the amount of narcotic medication you are taking at 1 month from surgery (many patients do it sooner) and rely on just Tylenol or NSAID's for pain management.     • It's normal to have an elevated temperature or low grade fever that may persist for 6 to 8 weeks after surgery. Call if 101 degrees or higher.    • It's normal for the hip area to feel warm and can persist for up to 6 months.    • It's normal to feel numbness around the incisional area (may resolve in 6 months to 1 year, but may be permanent). It's normal to feel a burning pain around the hip or near the incision (this will calm down as the swelling and inflammation lessens in your leg).    • You might find it hard to raise or lift your leg on your own right after surgery. It might take up to 4 weeks before you regain full strength. The walking you do daily will help strengthen the leg.    • It can be normal for some redness/pinkness to appear around the incision. Angry or bright redness should be a concern and call my nurse if this occurs.      • If you develop blisters on your skin on the operative leg, please call my nurse Jennifer for further instructions and treatment to help prevent infection.    • It is normal to have bruising up and down the operative leg and may take a long time to go away.    • It's good to apply ice for about 20 minutes 3-4 times a day (not directly on your skin though) to help with pain and swelling.    • It's normal to hear or feel clicking in the hip (which may go away as inflammation goes down).    • If you have increased swelling, you are likely overdoing it with activity.    • It might take a few weeks to “turn the corner” after surgery. Try to stay positive during this time. You will be able to return to your normal activities at a gradual pace in due time.

## 2021-03-04 NOTE — PROGRESS NOTES
Inpatient Cardiology   Daily Progress Note       Overview: S/p L THR with Dr. Rick on 3/2/21    Medically stable for discharge pending PT and ortho clearance      Assessment/Plan   DJD (degenerative joint disease)  Assessment & Plan  - DVT prophylaxis as per ortho service  - Pain management as per neurology   - Pulmonary toilet  - Ambulate  - Hold all natural supplements. Resume post op when ok to do so per ortho     Hyperlipidemia  Assessment & Plan  - Daily statin    Asymptomatic HIV infection (CMS/[facility])  Assessment & Plan  - Tivicay and Descovy are non-formulary-- patient may take his own medications that he brought with him    Chronic pancreatitis (CMS/[facility])  Assessment & Plan  - Follows with GI and pain management as outpatient -- follow-up as previously scheduled   - Pain managment as per ortho/neurology service     Bipolar 1 disorder (CMS/HCC)  Assessment & Plan  - Continue Lamictal and Wellbutrin  - Reports he does not take Seroquel frequently   - Benzos per ortho    DVT (deep venous thrombosis) (CMS/[facility])  Assessment & Plan  - Resume eliquis when cleared with ortho service. Per notes, to be resumed following 7 days of Aspirin BID  - DVT prophylaxis as per ortho    HARPER (obstructive sleep apnea)  Assessment & Plan  - H/o, does not wear CPAP at night following weight loss     - EtC02 monitoring per protocol   - Monitor for signs or symptoms of hypercapnea   - Aggressive pulmonary toilet  - GERD/aspiration precautions           Subjective  Seen and examined. OOB in chair, hopeful for discharge today. Still with complaints of surgical site pain. Seen by neurology this morning.  Has mild nausea this morning which occurs usually for him if he eats too much given his past gastric bypass surgery. Denies chest pain, shortness of breath, palpitations or HA. + void + flatus       Current Medications:    Scheduled:  • aspirin  325 mg oral BID   • atorvastatin  40 mg oral Daily (6p)   • buPROPion  100 mg oral Daily    • buPROPion  75 mg oral Nightly   • cyclobenzaprine  5 mg oral Nightly   • dolutegravir  50 mg oral q AM   • emtricitabine-tenofovir alafenamide  1 tablet oral q AM   • ketorolac  15 mg intravenous q6h SALO   • lamoTRIgine  200 mg oral Daily   • pantoprazole  40 mg oral Daily before breakfast   • sennosides-docusate sodium  1 tablet oral BID       Infusions:      PRN:  •  alum-mag hydroxide-simeth  •  clonazePAM  •  glucose **OR** dextrose **OR** glucagon **OR** dextrose in water  •  diphenhydrAMINE **OR** diphenhydrAMINE  •  ondansetron ODT **OR** ondansetron  •  oxyCODONE  •  oxyCODONE  •  polyethylene glycol  •  tiZANidine     Objective   Vital signs in last 24 hours:  Temp:  [36.6 °C (97.8 °F)-36.9 °C (98.5 °F)] 36.9 °C (98.5 °F)  Heart Rate:  [66-88] 83  Resp:  [16-18] 16  BP: (106-123)/(55-64) 123/62    Wt Readings from Last 3 Encounters:   03/02/21 95.7 kg (211 lb)   02/25/21 96.8 kg (213 lb 8 oz)   02/25/21 97.5 kg (215 lb)       Physical Exam   Constitutional: He appears well-developed and well-nourished. No distress.   HENT:   Head: Normocephalic and atraumatic.   Eyes: EOM are normal. No scleral icterus.   Neck: Neck supple. No JVD present.   Cardiovascular: Normal rate and regular rhythm.   No murmur heard.  Pulses:       Dorsalis pedis pulses are 2+ on the right side and 2+ on the left side.   Pulmonary/Chest: Effort normal. No respiratory distress. He has no wheezes. He has no rales. He exhibits no tenderness.   Abdominal: Soft. Bowel sounds are normal. He exhibits no distension. There is no abdominal tenderness. There is no rebound and no guarding.   Musculoskeletal:         General: No edema.      Comments: L hip dressing not well visualized    Neurological: He is alert.   Skin: Skin is warm and dry.   Psychiatric: He has a normal mood and affect.   Vitals reviewed.       Labs and Data:      Hematology  Lab Results   Component Value Date    WBC 7.44 03/04/2021    HGB 11.4 (L) 03/04/2021    HCT 34.2  (L) 03/04/2021     03/04/2021    INR 1.0 03/02/2021       Chemistries  Lab Results   Component Value Date     03/04/2021    K 4.1 03/04/2021     03/04/2021    CREATININE 0.8 03/04/2021    BUN 20 03/04/2021    CO2 24 03/04/2021    GLUCOSE 100 (H) 03/04/2021    CALCIUM 8.6 (L) 03/04/2021                        GABY Camara  3/4/2021

## 2021-03-04 NOTE — PROGRESS NOTES
Patient states his surgical site discomfort is well controlled; he is stable and is for discharge to home. Dr Wynne escribed narcotic regimen to patients pharmacy-see MD note    Per Dr Rick, asa 325mg BID x 7 days postop; then on 3/10/21, switch to/resume home eliquis     See orders and discharge summary

## 2021-03-04 NOTE — PROGRESS NOTES
"Neurology Daily Progress Note    Subjective/Objective:      Visit Vitals  BP (!) 116/55   Pulse 66   Temp 36.7 °C (98 °F) (Oral)   Resp 16   Ht 1.803 m (5' 11\")   Wt 95.7 kg (211 lb)   SpO2 99%   BMI 29.43 kg/m²       Current Facility-Administered Medications   Medication Dose Route Frequency   • alum-mag hydroxide-simeth  30 mL oral q4h PRN   • aspirin  325 mg oral BID   • atorvastatin  40 mg oral Daily (6p)   • buPROPion  100 mg oral Daily   • buPROPion  75 mg oral Nightly   • clonazePAM  0.5 mg oral 2x daily PRN   • cyclobenzaprine  5 mg oral Nightly   • glucose  16-32 g of dextrose oral PRN    Or   • dextrose  15-30 g of dextrose oral PRN    Or   • glucagon  1 mg intramuscular PRN    Or   • dextrose in water  25 mL intravenous PRN   • diphenhydrAMINE  25 mg oral q6h PRN    Or   • diphenhydrAMINE  25 mg intravenous q6h PRN   • dolutegravir  50 mg oral q AM   • emtricitabine-tenofovir alafenamide  1 tablet oral q AM   • ketorolac  15 mg intravenous q6h SALO   • lamoTRIgine  200 mg oral Daily   • ondansetron ODT  4 mg oral q8h PRN    Or   • ondansetron  4 mg intravenous q8h PRN   • oxyCODONE  10 mg oral q4h PRN   • oxyCODONE  5 mg oral 3x daily PRN   • pantoprazole  40 mg oral Daily before breakfast   • polyethylene glycol  17 g oral Nightly PRN   • sennosides-docusate sodium  1 tablet oral BID   • tiZANidine  4 mg oral q6h PRN         Neuro Exam:  Awake, alert, speech intact  Facies symmetric  Full fields  EOMI, ROBIN  Moving extremities, limited by mild left hip pain    Assessment/Plan     Pain Management  - s/p left hip replacement, POD #2  - out-pt regimen: MSIR 15mg daily; compliant per PDMP   - d/c Dilaudid, MSIR  - on Oxycodone 20mg q 8hrs, OxyIR 5mg TID prn as rescue dose  - continue Zanaflex prn    - taper schedule: 4 days of same schedule, 2 days of Oxycodone 15mg q 8hrs, 5mg TID prn rescue, 2 days of Oxycodone 10mg q 8hrs, 5mg TID prn rescue, 2 days of 5mg q 8hrs, 5mg BID rescue; continue Flexeril 5mg q " HS as out-pt  - will send narcotics electronically to pharmacy      - call Dr. Wynne for all analgesic needs [phone]  - reviewed w/ nursing          Arnulfo Wynne,   3/4/2021  7:12 AM

## 2021-03-18 ENCOUNTER — APPOINTMENT (EMERGENCY)
Dept: RADIOLOGY | Facility: HOSPITAL | Age: 46
DRG: 920 | End: 2021-03-18
Attending: EMERGENCY MEDICINE
Payer: COMMERCIAL

## 2021-03-18 ENCOUNTER — HOSPITAL ENCOUNTER (INPATIENT)
Facility: HOSPITAL | Age: 46
LOS: 4 days | Discharge: HOME | DRG: 920 | End: 2021-03-22
Attending: EMERGENCY MEDICINE | Admitting: HOSPITALIST
Payer: COMMERCIAL

## 2021-03-18 DIAGNOSIS — L76.82 POSTOPERATIVE SURGICAL COMPLICATION INVOLVING SUBCUTANEOUS TISSUE ASSOCIATED WITH NON-DERMATOLOGIC PROCEDURE, UNSPECIFIED COMPLICATION: Primary | ICD-10-CM

## 2021-03-18 PROBLEM — R19.7 DIARRHEA: Status: ACTIVE | Noted: 2021-03-18

## 2021-03-18 LAB
ABO + RH BLD: NORMAL
AMPHET UR QL SCN: NOT DETECTED
ANION GAP SERPL CALC-SCNC: 9 MEQ/L (ref 3–15)
APTT PPP: 27 SEC (ref 23–35)
BARBITURATES UR QL SCN: NOT DETECTED
BASOPHILS # BLD: 0.04 K/UL (ref 0.01–0.1)
BASOPHILS NFR BLD: 0.5 %
BENZODIAZ UR QL SCN: NOT DETECTED
BILIRUB UR QL STRIP.AUTO: NEGATIVE MG/DL
BLD GP AB SCN SERPL QL: NEGATIVE
BUN SERPL-MCNC: 12 MG/DL (ref 8–20)
CALCIUM SERPL-MCNC: 9.1 MG/DL (ref 8.9–10.3)
CANNABINOIDS UR QL SCN: POSITIVE
CHLORIDE SERPL-SCNC: 103 MEQ/L (ref 98–109)
CLARITY UR REFRACT.AUTO: CLEAR
CO2 SERPL-SCNC: 27 MEQ/L (ref 22–32)
COCAINE UR QL SCN: NOT DETECTED
COLOR UR AUTO: YELLOW
CREAT SERPL-MCNC: 0.9 MG/DL (ref 0.8–1.3)
CRP SERPL-MCNC: <6 MG/L
D AG BLD QL: POSITIVE
DIFFERENTIAL METHOD BLD: ABNORMAL
EOSINOPHIL # BLD: 0.04 K/UL (ref 0.04–0.54)
EOSINOPHIL NFR BLD: 0.5 %
ERYTHROCYTE [DISTWIDTH] IN BLOOD BY AUTOMATED COUNT: 14.6 % (ref 11.6–14.4)
ERYTHROCYTE [SEDIMENTATION RATE] IN BLOOD BY WESTERGREN METHOD: 34 MM/HR
GFR SERPL CREATININE-BSD FRML MDRD: >60 ML/MIN/1.73M*2
GLUCOSE SERPL-MCNC: 113 MG/DL (ref 70–99)
GLUCOSE UR STRIP.AUTO-MCNC: NEGATIVE MG/DL
HCT VFR BLDCO AUTO: 41.8 % (ref 40.1–51)
HGB BLD-MCNC: 13.6 G/DL (ref 13.7–17.5)
HGB UR QL STRIP.AUTO: NEGATIVE
IMM GRANULOCYTES # BLD AUTO: 0.03 K/UL (ref 0–0.08)
IMM GRANULOCYTES NFR BLD AUTO: 0.4 %
INR PPP: 1
KETONES UR STRIP.AUTO-MCNC: NEGATIVE MG/DL
LABORATORY COMMENT REPORT: NORMAL
LEUKOCYTE ESTERASE UR QL STRIP.AUTO: NEGATIVE
LYMPHOCYTES # BLD: 2.35 K/UL (ref 1.2–3.5)
LYMPHOCYTES NFR BLD: 30.8 %
MCH RBC QN AUTO: 30.3 PG (ref 28–33.2)
MCHC RBC AUTO-ENTMCNC: 32.5 G/DL (ref 32.2–36.5)
MCV RBC AUTO: 93.1 FL (ref 83–98)
MONOCYTES # BLD: 0.64 K/UL (ref 0.3–1)
MONOCYTES NFR BLD: 8.4 %
NEUTROPHILS # BLD: 4.54 K/UL (ref 1.7–7)
NEUTS SEG NFR BLD: 59.4 %
NITRITE UR QL STRIP.AUTO: NEGATIVE
NRBC BLD-RTO: 0 %
OPIATES UR QL SCN: POSITIVE
PCP UR QL SCN: NOT DETECTED
PDW BLD AUTO: 9.2 FL (ref 9.4–12.4)
PH UR STRIP.AUTO: 6 [PH]
PLATELET # BLD AUTO: 438 K/UL (ref 150–350)
POTASSIUM SERPL-SCNC: 3.7 MEQ/L (ref 3.6–5.1)
PROT UR QL STRIP.AUTO: NEGATIVE
PROTHROMBIN TIME: 13.1 SEC (ref 12.2–14.5)
RBC # BLD AUTO: 4.49 M/UL (ref 4.5–5.8)
SARS-COV-2 RNA RESP QL NAA+PROBE: NEGATIVE
SODIUM SERPL-SCNC: 139 MEQ/L (ref 136–144)
SP GR UR REFRACT.AUTO: >1.035
TROPONIN I SERPL-MCNC: <0.02 NG/ML
UROBILINOGEN UR STRIP-ACNC: 1 EU/DL
WBC # BLD AUTO: 7.64 K/UL (ref 3.8–10.5)

## 2021-03-18 PROCEDURE — 73502 X-RAY EXAM HIP UNI 2-3 VIEWS: CPT | Mod: LT

## 2021-03-18 PROCEDURE — 63700000 HC SELF-ADMINISTRABLE DRUG: Performed by: HOSPITALIST

## 2021-03-18 PROCEDURE — 71260 CT THORAX DX C+: CPT | Mod: ME

## 2021-03-18 PROCEDURE — 93971 EXTREMITY STUDY: CPT | Mod: LT

## 2021-03-18 PROCEDURE — 85610 PROTHROMBIN TIME: CPT | Performed by: PHYSICIAN ASSISTANT

## 2021-03-18 PROCEDURE — 96374 THER/PROPH/DIAG INJ IV PUSH: CPT | Mod: 59

## 2021-03-18 PROCEDURE — 63700000 HC SELF-ADMINISTRABLE DRUG

## 2021-03-18 PROCEDURE — 96361 HYDRATE IV INFUSION ADD-ON: CPT | Mod: 59

## 2021-03-18 PROCEDURE — 99284 EMERGENCY DEPT VISIT MOD MDM: CPT | Mod: 25

## 2021-03-18 PROCEDURE — U0003 INFECTIOUS AGENT DETECTION BY NUCLEIC ACID (DNA OR RNA); SEVERE ACUTE RESPIRATORY SYNDROME CORONAVIRUS 2 (SARS-COV-2) (CORONAVIRUS DISEASE [COVID-19]), AMPLIFIED PROBE TECHNIQUE, MAKING USE OF HIGH THROUGHPUT TECHNOLOGIES AS DESCRIBED BY CMS-2020-01-R: HCPCS | Performed by: PHYSICIAN ASSISTANT

## 2021-03-18 PROCEDURE — 80048 BASIC METABOLIC PNL TOTAL CA: CPT | Performed by: PHYSICIAN ASSISTANT

## 2021-03-18 PROCEDURE — 63600105 HC IODINE BASED CONTRAST: Performed by: PHYSICIAN ASSISTANT

## 2021-03-18 PROCEDURE — 12000000 HC ROOM AND CARE MED/SURG

## 2021-03-18 PROCEDURE — 96376 TX/PRO/DX INJ SAME DRUG ADON: CPT | Mod: 59

## 2021-03-18 PROCEDURE — 36415 COLL VENOUS BLD VENIPUNCTURE: CPT | Performed by: EMERGENCY MEDICINE

## 2021-03-18 PROCEDURE — 63600000 HC DRUGS/DETAIL CODE: Performed by: PHYSICIAN ASSISTANT

## 2021-03-18 PROCEDURE — 80307 DRUG TEST PRSMV CHEM ANLYZR: CPT | Performed by: HOSPITALIST

## 2021-03-18 PROCEDURE — 86850 RBC ANTIBODY SCREEN: CPT

## 2021-03-18 PROCEDURE — 85652 RBC SED RATE AUTOMATED: CPT | Performed by: PHYSICIAN ASSISTANT

## 2021-03-18 PROCEDURE — 85730 THROMBOPLASTIN TIME PARTIAL: CPT | Performed by: PHYSICIAN ASSISTANT

## 2021-03-18 PROCEDURE — 63600000 HC DRUGS/DETAIL CODE

## 2021-03-18 PROCEDURE — G1004 CDSM NDSC: HCPCS

## 2021-03-18 PROCEDURE — 1123F ACP DISCUSS/DSCN MKR DOCD: CPT | Performed by: HOSPITALIST

## 2021-03-18 PROCEDURE — 84484 ASSAY OF TROPONIN QUANT: CPT | Performed by: EMERGENCY MEDICINE

## 2021-03-18 PROCEDURE — 81003 URINALYSIS AUTO W/O SCOPE: CPT | Performed by: HOSPITALIST

## 2021-03-18 PROCEDURE — 86140 C-REACTIVE PROTEIN: CPT | Performed by: PHYSICIAN ASSISTANT

## 2021-03-18 PROCEDURE — 0S9B3ZZ DRAINAGE OF LEFT HIP JOINT, PERCUTANEOUS APPROACH: ICD-10-PCS | Performed by: RADIOLOGY

## 2021-03-18 PROCEDURE — 87040 BLOOD CULTURE FOR BACTERIA: CPT | Performed by: PHYSICIAN ASSISTANT

## 2021-03-18 PROCEDURE — 63600000 HC DRUGS/DETAIL CODE: Performed by: HOSPITALIST

## 2021-03-18 PROCEDURE — 85025 COMPLETE CBC W/AUTO DIFF WBC: CPT | Performed by: PHYSICIAN ASSISTANT

## 2021-03-18 PROCEDURE — 73702 CT LWR EXTREMITY W/O&W/DYE: CPT | Mod: LT,MF

## 2021-03-18 PROCEDURE — 99223 1ST HOSP IP/OBS HIGH 75: CPT | Performed by: HOSPITALIST

## 2021-03-18 PROCEDURE — 25800000 HC PHARMACY IV SOLUTIONS: Performed by: PHYSICIAN ASSISTANT

## 2021-03-18 RX ORDER — HYDROMORPHONE HYDROCHLORIDE 1 MG/ML
INJECTION, SOLUTION INTRAMUSCULAR; INTRAVENOUS; SUBCUTANEOUS
Status: COMPLETED
Start: 2021-03-18 | End: 2021-03-18

## 2021-03-18 RX ORDER — BUPROPION HYDROCHLORIDE 100 MG/1
100 TABLET ORAL DAILY
Status: DISCONTINUED | OUTPATIENT
Start: 2021-03-19 | End: 2021-03-22 | Stop reason: HOSPADM

## 2021-03-18 RX ORDER — ATORVASTATIN CALCIUM 40 MG/1
40 TABLET, FILM COATED ORAL EVERY MORNING
Status: DISCONTINUED | OUTPATIENT
Start: 2021-03-19 | End: 2021-03-22 | Stop reason: HOSPADM

## 2021-03-18 RX ORDER — HYDROMORPHONE HYDROCHLORIDE 1 MG/ML
0.5 INJECTION, SOLUTION INTRAMUSCULAR; INTRAVENOUS; SUBCUTANEOUS ONCE
Status: COMPLETED | OUTPATIENT
Start: 2021-03-18 | End: 2021-03-18

## 2021-03-18 RX ORDER — LIDOCAINE 560 MG/1
1 PATCH PERCUTANEOUS; TOPICAL; TRANSDERMAL DAILY
Status: DISCONTINUED | OUTPATIENT
Start: 2021-03-18 | End: 2021-03-22 | Stop reason: HOSPADM

## 2021-03-18 RX ORDER — HYDROMORPHONE HYDROCHLORIDE 1 MG/ML
0.5 INJECTION, SOLUTION INTRAMUSCULAR; INTRAVENOUS; SUBCUTANEOUS
Status: DISCONTINUED | OUTPATIENT
Start: 2021-03-18 | End: 2021-03-19

## 2021-03-18 RX ORDER — QUETIAPINE FUMARATE 100 MG/1
100 TABLET, FILM COATED ORAL 2 TIMES DAILY
Status: CANCELLED | OUTPATIENT
Start: 2021-03-18

## 2021-03-18 RX ORDER — CLONAZEPAM 0.5 MG/1
0.5 TABLET ORAL AS NEEDED
Status: DISCONTINUED | OUTPATIENT
Start: 2021-03-18 | End: 2021-03-22 | Stop reason: HOSPADM

## 2021-03-18 RX ORDER — LAMOTRIGINE 200 MG/1
200 TABLET ORAL DAILY
Status: DISCONTINUED | OUTPATIENT
Start: 2021-03-19 | End: 2021-03-22 | Stop reason: HOSPADM

## 2021-03-18 RX ORDER — DEXTROSE 50 % IN WATER (D50W) INTRAVENOUS SYRINGE
25 AS NEEDED
Status: DISCONTINUED | OUTPATIENT
Start: 2021-03-18 | End: 2021-03-22 | Stop reason: HOSPADM

## 2021-03-18 RX ORDER — BUPROPION HYDROCHLORIDE 75 MG/1
75 TABLET ORAL DAILY PRN
Status: DISCONTINUED | OUTPATIENT
Start: 2021-03-18 | End: 2021-03-22 | Stop reason: HOSPADM

## 2021-03-18 RX ORDER — IBUPROFEN 200 MG
16-32 TABLET ORAL AS NEEDED
Status: DISCONTINUED | OUTPATIENT
Start: 2021-03-18 | End: 2021-03-22 | Stop reason: HOSPADM

## 2021-03-18 RX ORDER — ACETAMINOPHEN 325 MG/1
975 TABLET ORAL
Status: DISCONTINUED | OUTPATIENT
Start: 2021-03-18 | End: 2021-03-19

## 2021-03-18 RX ORDER — CYCLOBENZAPRINE HCL 5 MG
5 TABLET ORAL NIGHTLY
Status: DISCONTINUED | OUTPATIENT
Start: 2021-03-18 | End: 2021-03-22 | Stop reason: HOSPADM

## 2021-03-18 RX ORDER — DEXTROSE 40 %
15-30 GEL (GRAM) ORAL AS NEEDED
Status: DISCONTINUED | OUTPATIENT
Start: 2021-03-18 | End: 2021-03-22 | Stop reason: HOSPADM

## 2021-03-18 RX ORDER — LIDOCAINE 560 MG/1
PATCH PERCUTANEOUS; TOPICAL; TRANSDERMAL
Status: COMPLETED
Start: 2021-03-18 | End: 2021-03-19

## 2021-03-18 RX ORDER — PANTOPRAZOLE SODIUM 40 MG/1
40 TABLET, DELAYED RELEASE ORAL EVERY MORNING
Status: DISCONTINUED | OUTPATIENT
Start: 2021-03-19 | End: 2021-03-22 | Stop reason: HOSPADM

## 2021-03-18 RX ADMIN — HYDROMORPHONE HYDROCHLORIDE 0.5 MG: 1 INJECTION, SOLUTION INTRAMUSCULAR; INTRAVENOUS; SUBCUTANEOUS at 19:58

## 2021-03-18 RX ADMIN — ACETAMINOPHEN 975 MG: 325 TABLET, FILM COATED ORAL at 19:56

## 2021-03-18 RX ADMIN — HYDROMORPHONE HYDROCHLORIDE 0.5 MG: 1 INJECTION, SOLUTION INTRAMUSCULAR; INTRAVENOUS; SUBCUTANEOUS at 16:39

## 2021-03-18 RX ADMIN — HYDROMORPHONE HYDROCHLORIDE 0.5 MG: 1 INJECTION, SOLUTION INTRAMUSCULAR; INTRAVENOUS; SUBCUTANEOUS at 23:29

## 2021-03-18 RX ADMIN — LIDOCAINE 1 PATCH: 246 PATCH TOPICAL at 19:56

## 2021-03-18 RX ADMIN — IOHEXOL 130 ML: 300 INJECTION, SOLUTION INTRAVENOUS at 17:29

## 2021-03-18 RX ADMIN — SODIUM CHLORIDE 500 ML: 9 INJECTION, SOLUTION INTRAVENOUS at 15:41

## 2021-03-18 RX ADMIN — HYDROMORPHONE HYDROCHLORIDE 0.5 MG: 1 INJECTION, SOLUTION INTRAMUSCULAR; INTRAVENOUS; SUBCUTANEOUS at 15:49

## 2021-03-18 RX ADMIN — CYCLOBENZAPRINE HYDROCHLORIDE 5 MG: 5 TABLET, FILM COATED ORAL at 21:35

## 2021-03-18 ASSESSMENT — ENCOUNTER SYMPTOMS
NUMBNESS: 0
FEVER: 1
WOUND: 1
WEAKNESS: 0
VOMITING: 1
CHILLS: 0
COLOR CHANGE: 0
NAUSEA: 1
BRUISES/BLEEDS EASILY: 1

## 2021-03-18 NOTE — H&P
"   Hospital Medicine Service -  History & Physical        CHIEF COMPLAINT   Left thigh pain      HISTORY OF PRESENT ILLNESS      Sam Fitzgerald Chevalier is a 45 y.o. male with a past medical history of prior DVT, bipolar disorder, HIV, chronic pancreatitis, anemia, left total hip arthroplasty on 3/2/2021 by Dr. Rick who presents with left thigh pain.  Patient reports that last Thursday he \"did not feel right.\"  He reports that he was evaluated by Dr. Rick's PA, and a left hip x-ray was obtained and was WNL.  Patient reports that he went into the office to have his wound evaluated and it looked okay at that time.  Patient reports that he did notice increased swelling in his left lower extremity over the weekend. Reports that on Tuesday he noticed that his incision site was painful and erythematous.  Reports that he had been taking leftover oxycodone for the LLE pain which had been helping, but today did not help.  Also reports that on Tuesday he began to have dry heaves, and watery diarrhea.  Patient reports that he has been having night sweats and chills for the past few days, reports low-grade fevers, T-max of 100.1 °F.  Denies abdominal pain.     I personally discussed CODE STATUS with the patient and he would like to be a full code.    In the ER, vital signs were stable.  No evidence of leukocytosis on labs.  Blood cultures were obtained.  Left lower extremity ultrasound was obtained and did not show any evidence of DVT.  CT of the left hip showed: Large rim-enhancing fluid collection within the anterolateral thigh/hip musculature, lateral thigh subcutaneous soft tissues, and appearing to extend to the anterior aspect of the femoral neck component of the total hip arthroplasty, with evaluation limited due to streak artifact.  CT chest was obtained and showed no evidence of acute PE.  In the ER, patient was evaluated by the orthopedics team, who recommended that IR be consulted for aspiration.  Also " recommended holding Eliquis and holding off on antibiotics at this time as patient is not septic.    PAST MEDICAL AND SURGICAL HISTORY      Past Medical History:   Diagnosis Date   • Alcohol abuse    • Anemia     last transfusion 2017   • Bipolar 1 disorder (CMS/[facility])    • Deep vein thrombosis (CMS/[facility]) 2009    last clot 2015 PE   • Depression    • Drug abuse (CMS/[facility])    • History of pulmonary embolus (PE)    • HIV disease (CMS/[facility])    • Pancreatitis    • Sleep apnea 2004    lost 150lb  no cpap currently       Past Surgical History:   Procedure Laterality Date   • ABDOMINOPLASTY  2006   • DENTAL SURGERY  2020    extractions     • GASTRIC BYPASS  2004   • JOINT REPLACEMENT      left hip replacement       PCP: Jenae Beck MD    MEDICATIONS      Prior to Admission medications    Medication Sig Start Date End Date Taking? Authorizing Provider   apixaban (ELIQUIS) 2.5 mg tablet Take 1 tablet (2.5 mg total) by mouth 2 (two) times a day. (RESUME ON 3/10/21) 3/10/21   Sabina Patel CRNP   aspirin 325 mg EC tablet Take 1 tablet (325 mg total) by mouth 2 (two) times a day. (LAST DOSE IS ON 3/9/21 IN PM) 3/4/21   Sabina Patel CRNP   atorvastatin (LIPITOR) 40 mg tablet Take 40 mg by mouth every morning.    Gerald Paez MD   buPROPion (WELLBUTRIN) 100 mg tablet Take 100 mg by mouth daily. Patient takes 100 mg in the morning,     Gerald Paez MD   buPROPion (WELLBUTRIN) 75 mg tablet Take 75 mg by mouth daily as needed. Patient takes bupropion 100 mg daily in the morning, and 75 mg at lunch prn (two separate entries)     Gerald Paez MD   clonazePAM (klonoPIN) 0.5 mg tablet Take 0.5 mg by mouth 2 (two) times a week (Sun, Wed). Pt takes as needed     Gerald Paez MD   cyclobenzaprine (FLEXERIL) 5 mg tablet Take 1 tablet (5 mg total) by mouth nightly. TAKE AS DIRECTED BY DR CALVO 3/4/21   Sabina Patel CRNP   dolutegravir (TIVICAY) 50 mg tablet Take 50 mg by mouth every  morning.    Gerald Paez MD   emtricitabine-tenofovir alafenamide (DESCOVY) 200-25 mg tablet tablet Take 1 tablet by mouth every morning.    Gerald Paez MD   lamoTRIgine (LaMICtal) 200 mg tablet Take 200 mg by mouth daily. 1 tablet daily     Gerald Paez MD   oxyCODONE (ROXICODONE) 5 mg immediate release tablet TAKE AS DIRECTED BY DR CALVO WHO SENT A SCRIPT TO YOUR PHARMACY 3/4/21   Sabina Patel CRNP   pantoprazole (PROTONIX) 40 mg EC tablet Take 40 mg by mouth every morning.    Gerald Paez MD   QUEtiapine (SEROquel) 100 mg tablet Take 100 mg by mouth 2 (two) times a day. Pt takes nightly prn but prescribed bid     ProviderGerald MD       ALLERGIES      Patient has no known allergies.    FAMILY HISTORY      Family History   Adopted: Yes       SOCIAL HISTORY      Social History     Socioeconomic History   • Marital status: Single     Spouse name: None   • Number of children: None   • Years of education: None   • Highest education level: None   Occupational History   • None   Social Needs   • Financial resource strain: None   • Food insecurity     Worry: None     Inability: None   • Transportation needs     Medical: None     Non-medical: None   Tobacco Use   • Smoking status: Former Smoker     Packs/day: 1.00     Years: 22.00     Pack years: 22.00     Types: Cigarettes     Quit date: 2021     Years since quittin.2   • Smokeless tobacco: Never Used   • Tobacco comment: smokes every once in a while, few cigs on weekend   Substance and Sexual Activity   • Alcohol use: Not Currently     Alcohol/week: 3.0 - 4.0 standard drinks     Types: 3 - 4 Shots of liquor per week     Frequency: 2-4 times a month   • Drug use: Not Currently     Types: Heroin, Codeine, IV, Marijuana     Comment: no longer uses heroin or cocaine , medical marijuana every few days   • Sexual activity: Defer   Lifestyle   • Physical activity     Days per week: None     Minutes per session: None    • Stress: None   Relationships   • Social connections     Talks on phone: None     Gets together: None     Attends Sabianist service: None     Active member of club or organization: None     Attends meetings of clubs or organizations: None     Relationship status: None   • Intimate partner violence     Fear of current or ex partner: None     Emotionally abused: None     Physically abused: None     Forced sexual activity: None   Other Topics Concern   • None   Social History Narrative    Manager at  company.        REVIEW OF SYSTEMS      All other systems reviewed and negative except as noted in HPI    PHYSICAL EXAMINATION      Temp:  [36.9 °C (98.4 °F)] 36.9 °C (98.4 °F)  Heart Rate:  [76-96] 80  Resp:  [17-26] 26  BP: (125-140)/(76-94) 139/94  Body mass index is 30.85 kg/m².    Physical Exam  Vitals signs reviewed.   Constitutional:       Comments: Well-appearing male, no acute distress   HENT:      Head: Normocephalic and atraumatic.      Nose: Nose normal.      Mouth/Throat:      Mouth: Mucous membranes are dry.   Neck:      Musculoskeletal: Normal range of motion.   Cardiovascular:      Rate and Rhythm: Normal rate and regular rhythm.   Pulmonary:      Effort: Pulmonary effort is normal. No respiratory distress.      Breath sounds: Normal breath sounds.   Abdominal:      Palpations: Abdomen is soft.      Tenderness: There is no abdominal tenderness.   Musculoskeletal:      Right lower leg: No edema.      Comments: +surgical incision on left thigh, with surrounding erythema and edema, no drainage noted   Skin:     General: Skin is warm and dry.   Neurological:      General: No focal deficit present.   Psychiatric:      Comments: He is pleasant and conversational         LABS / IMAGING / EKG        Labs  I have reviewed the patient's pertinent labs.  Significant abnormals are below.  Results from last 7 days   Lab Units 03/18/21  1532   WBC K/uL 7.64   HEMOGLOBIN g/dL 13.6*   HEMATOCRIT % 41.8    PLATELETS K/uL 438*     Results from last 7 days   Lab Units 03/18/21  1532   SODIUM mEQ/L 139   POTASSIUM mEQ/L 3.7   CHLORIDE mEQ/L 103   CO2 mEQ/L 27   BUN mg/dL 12   CREATININE mg/dL 0.9   CALCIUM mg/dL 9.1   GLUCOSE mg/dL 113*       Imaging  I have independently reviewed the pertinent imaging from the last 24 hrs.    SARS-CoV-2 (COVID-19) (no units)   Date/Time Value   03/18/2021 1639 Negative       ECG/Telemetry  no ekg    ASSESSMENT AND PLAN           * Postoperative surgical complication involving subcutaneous tissue associated with non-dermatologic procedure  Assessment & Plan  -s/p L MIGUEL by Dr. Rick on 3-2-21  -now with erythema, swelling and warmth surrounding incision site  -CT L hip shows: Large rim-enhancing fluid collection within the anterolateral thigh/hip musculature, lateral thigh subcutaneous soft tissues, and appearing to extend to the anterior aspect of the femoral neck component of the total hip arthroplasty,  -Appreciate Ortho recommendations, plan for IR aspiration tomorrow  -N.p.o. after midnight  -We will hold off on antibiotics for now as patient is not septic  -Blood cultures pending  -Tylenol around-the-clock, lidocaine patch and Dilaudid as needed for breakthrough pain  -hold eliquis for now, resume as able    Asymptomatic HIV infection (CMS/[facility])  Assessment & Plan  -Patient reports that his viral load is currently undetectable  -Continue descovy and tivicay- patient reports he is able to bring his medications in from home    DVT (deep venous thrombosis) (CMS/[facility])  Assessment & Plan  -hx of DVT in 2015  -hold Eliquis for now , would resume as soon as able     Diarrhea  Assessment & Plan  -no recent abx use or travel  -patient reports 3 episodes of watery stools for the past few days  -supportive care for now    Hyperlipidemia  Assessment & Plan  -continue statin    Chronic pancreatitis (CMS/[facility])  Assessment & Plan  -takes morphine as an outpatient   -PDMP reviewed, will hold  morphine for now and use dilaudid prn    Bipolar 1 disorder (CMS/[facility])  Assessment & Plan  -Continue Wellbutrin, patient reports that he is no longer taking Seroquel    HARPER (obstructive sleep apnea)  Assessment & Plan  -no longer uses CPAP        VTE Assessment: [place] VTE Score: 6  VTE Prophylaxis: Eliquis on hold for now due to IR aspiration tomorrow, restart as soon as able   Code Status: Full Code      Estimated Discharge Date: 3/22/2021  Disposition Planning: Return home, once medically stable     DANIEL Ohara  3/18/2021

## 2021-03-18 NOTE — ED ATTESTATION NOTE
"Physician Attestation:     I personally saw and evaluated the patient, participated in the management, and agree with the findings in the above note except as where stated.  The Physician Assistant and I discussed  the case, workup, and disposition.      Chief Complaint  Chief Complaint   Patient presents with   • Lower Extremity Issue         45 m presents for eval  Pt approx 2-3 weeks post op a total left hip replacement   Pt also with a hx of vte on AC  Was off AC for surgery but recently went back on  Over the past 7-10 days pt felt like his surgical site was getting infected  Redness, swelling to the hip  Is able to range it  Pt states \" low grade temp\" no higher than 100   Has left calf tenderness  Has intermittent cp and sob  [facility] at [facility] did sx    Non toxic  Very pleasant   rrr  Lungs cta  Left hip: incision - c/d/i, erythema and warmth surrounding incision. significant effusion palpated and visualized. ttp mild. Able to flex hip without significant pain. No reproducible calf tenderness     Plan:   Concern for post op septic arthritis  Also, need to rule out VTE given hx, off ac, recent surgery, calf pain, and  Cp/sob       Kerwin Trejo, DO  03/18/21 1530    "

## 2021-03-18 NOTE — ED PROVIDER NOTES
Emergency Medicine Note  HPI   HISTORY OF PRESENT ILLNESS     45-year-old male with history of anemia, bipolar disorder, DVT on Eliquis, HIV, pancreatitis, HARPER presents to the emergency room for evaluation of left hip pain.  Patient underwent left hip arthroplasty on 3/2/2021 with Dr. Rick at [facility].  He reports increased pain over the past week with swelling and tenderness over the incision site.  He notes history of postoperative infections with prior surgeries and became concerned.  He had a normal follow-up with his surgeon last Thursday, and was seen today at [facility] urgent care and was sent to the emergency room for further treatment.  Admits to low-grade fevers, 100 F and nausea as well as vomiting.      History provided by:  Patient  Lower Extremity Issue  Associated symptoms: fever          Patient History   PAST HISTORY     Reviewed from Nursing Triage: Tobacco  Allergies  Meds  Problems  Med Hx  Surg Hx  Fam Hx  Soc Hx        Past Medical History:   Diagnosis Date   • Alcohol abuse    • Anemia     last transfusion    • Bipolar 1 disorder (CMS/HCC)    • Deep vein thrombosis (CMS/HCC)     last clot  PE   • Depression    • Drug abuse (CMS/HCC)    • History of pulmonary embolus (PE)    • HIV disease (CMS/HCC)    • Pancreatitis    • Sleep apnea     lost 150lb  no cpap currently       Past Surgical History:   Procedure Laterality Date   • ABDOMINOPLASTY     • DENTAL SURGERY      extractions     • GASTRIC BYPASS     • JOINT REPLACEMENT      left hip replacement       Family History   Adopted: Yes       Social History     Tobacco Use   • Smoking status: Former Smoker     Packs/day: 1.00     Years: 22.00     Pack years: 22.00     Types: Cigarettes     Quit date: 2021     Years since quittin.2   • Smokeless tobacco: Never Used   • Tobacco comment: smokes every once in a while, few cigs on weekend   Substance Use Topics   • Alcohol use: Not Currently      Alcohol/week: 3.0 - 4.0 standard drinks     Types: 3 - 4 Shots of liquor per week     Frequency: 2-4 times a month   • Drug use: Not Currently     Types: Heroin, Codeine, IV, Marijuana     Comment: no longer uses heroin or cocaine , medical marijuana every few days         Review of Systems   REVIEW OF SYSTEMS     Review of Systems   Constitutional: Positive for fever. Negative for chills.   Gastrointestinal: Positive for nausea and vomiting.   Musculoskeletal: Positive for gait problem.   Skin: Positive for wound. Negative for color change.   Allergic/Immunologic: Negative for immunocompromised state.   Neurological: Negative for weakness and numbness.   Hematological: Bruises/bleeds easily (on eliquis).   All other systems reviewed and are negative.        VITALS     ED Vitals    Date/Time Temp Pulse Resp BP SpO2 [facility]   03/18/21 2119 36.9 °C (98.5 °F) 94 16 128/67 97 % EZ   03/18/21 2047 -- 90 18 121/82 93 % DLP   03/18/21 1947 -- 80 26 139/94 98 % DLP   03/18/21 1917 -- 76 21 138/89 98 % DLP   03/18/21 1747 -- 76 22 140/86 99 % DLP   03/18/21 1628 -- 84 23 125/76 99 % DLP   03/18/21 1542 -- 84 20 125/87 98 % DLP   03/18/21 1432 36.9 °C (98.4 °F) 96 17 137/82 100 % CB        Pulse Ox %: 100 % (03/18/21 1527)  Pulse Ox Interpretation: Normal (03/18/21 1527)  Heart Rate: 96 (03/18/21 1527)  Rhythm Strip Interpretation: Normal Sinus Rhythm (03/18/21 1527)     Physical Exam   PHYSICAL EXAM     Physical Exam  Vitals signs and nursing note reviewed.   Constitutional:       Appearance: Normal appearance.   HENT:      Head: Normocephalic and atraumatic.   Abdominal:      General: Abdomen is flat. Bowel sounds are normal.      Palpations: Abdomen is soft.      Tenderness: There is no abdominal tenderness.   Musculoskeletal:      Comments: Left hip incision well approximated with sutures, no dehiscence, large area of edema surrounding incision with central erythema and mild warmth   Skin:     General: Skin is warm and dry.    Neurological:      General: No focal deficit present.      Mental Status: He is alert and oriented to person, place, and time.           PROCEDURES     Procedures     DATA     Results     Procedure Component Value Units Date/Time    Blood Culture Blood, Venous [[id number]] Collected: 03/18/21 1532    Specimen: Blood, Venous Updated: 03/22/21 1901     Culture No growth at 96 hours    Blood Culture Blood, Venous [[id number]] Collected: 03/18/21 1532    Specimen: Blood, Venous Updated: 03/22/21 1901     Culture No growth at 96 hours    [place] Draw Panel [[id number]] Collected: 03/18/21 1532    Specimen: Blood, Venous Updated: 03/19/21 0000    Narrative:      The following orders were created for panel order [place] Draw Panel.  Procedure                               Abnormality         Status                     ---------                               -----------         ------                     RAINBOW LAVENDER[[id number]]                                                            RAINBOW LT BLUE[[id number]]                                  Final result               RAINBOW LT GREEN[[id number]]                                                            RAINBOW GOLD[[id number]]                                     Final result                 Please view results for these tests on the individual orders.    RAINBOW LT BLUE [[id number]] Collected: 03/18/21 1532    Specimen: Blood, Venous Updated: 03/19/21 0000    RAINBOW GOLD [[id number]] Collected: 03/18/21 1532    Specimen: Blood, Venous Updated: 03/19/21 0000    Sedimentation rate [[id number]]  (Abnormal) Collected: 03/18/21 1532    Specimen: Blood, Venous Updated: 03/18/21 1815     Sed Rate 34 mm/hr     Type and screen [[id number]] Collected: 03/18/21 1639    Specimen: Blood, Venous Updated: 03/18/21 1806     Antibody Screen Negative     ABO A     Rh Factor Positive     History Check Previous type on file    SARS-CoV-2 (COVID-19), PCR Nasopharynx [[id number]]  (Normal)  Collected: 03/18/21 1639    Specimen: Nasopharyngeal Swab from Nasopharynx Updated: 03/18/21 1754    Narrative:      The following orders were created for panel order SARS-CoV-2 (COVID-19), PCR Nasopharynx.  Procedure                               Abnormality         Status                     ---------                               -----------         ------                     SARS-CoV-2 (COVID-19), P...[[id number]]  Normal              Final result                 Please view results for these tests on the individual orders.    SARS-CoV-2 (COVID-19), PCR Nasopharynx [[id number]]  (Normal) Collected: 03/18/21 1639    Specimen: Nasopharyngeal Swab from Nasopharynx Updated: 03/18/21 1754     SARS-CoV-2 (COVID-19) Negative    Protime-INR [[id number]]  (Normal) Collected: 03/18/21 1639    Specimen: Blood, Venous Updated: 03/18/21 1702     PT 13.1 sec      INR 1.0     Comment: INR has no defined significance when PT is within Reference Range.       Narrative:      Specimen hemolyzed, clotting times may be falsely shortened      APTT [[id number]]  (Normal) Collected: 03/18/21 1639    Specimen: Blood, Venous Updated: 03/18/21 1702     PTT 27 sec     C-reactive protein [[id number]]  (Normal) Collected: 03/18/21 1532    Specimen: Blood, Venous Updated: 03/18/21 1644     CRP <6.00 mg/L     Troponin I [[id number]]  (Normal) Collected: 03/18/21 1532    Specimen: Blood, Venous Updated: 03/18/21 1617     Troponin I <0.02 ng/mL     Basic metabolic panel [[id number]]  (Abnormal) Collected: 03/18/21 1532    Specimen: Blood, Venous Updated: 03/18/21 1615     Sodium 139 mEQ/L      Potassium 3.7 mEQ/L      Comment: Results obtained on plasma. Plasma Potassium values may be up to 0.4 mEQ/L less than serum values. The differences may be greater for patients with high platelet or white cell counts.        Chloride 103 mEQ/L      CO2 27 mEQ/L      BUN 12 mg/dL      Creatinine 0.9 mg/dL      Glucose 113 mg/dL      Calcium 9.1 mg/dL      eGFR  >60.0 mL/min/1.73m*2      Anion Gap 9 mEQ/L     CBC and differential [[id number]]  (Abnormal) Collected: 03/18/21 1532    Specimen: Blood, Venous Updated: 03/18/21 1557     WBC 7.64 K/uL      RBC 4.49 M/uL      Hemoglobin 13.6 g/dL      Hematocrit 41.8 %      MCV 93.1 fL      MCH 30.3 pg      MCHC 32.5 g/dL      RDW 14.6 %      Platelets 438 K/uL      Comment: PLT CLUMPING SUSPECTED. PLT COUNT MAY BE SLIGHTLY HIGHER THAN REPORTED. IF CLINICALLY WARRANTED, SUGGEST COLLECTING SODIUM CITRATE TUBE (BLUE TOP) IN ADDITION TO EDTA TUBE FOR FOLLOW UP CBC TESTING.        MPV 9.2 fL      Differential Type Auto     nRBC 0.0 %      Immature Granulocytes 0.4 %      Neutrophils 59.4 %      Lymphocytes 30.8 %      Monocytes 8.4 %      Eosinophils 0.5 %      Basophils 0.5 %      Immature Granulocytes, Absolute 0.03 K/uL      Neutrophils, Absolute 4.54 K/uL      Lymphocytes, Absolute 2.35 K/uL      Monocytes, Absolute 0.64 K/uL      Eosinophils, Absolute 0.04 K/uL      Basophils, Absolute 0.04 K/uL           Imaging Results          CT HIP LEFT WITH AND WITHOUT IV CONTRAST (Final result)  Result time 03/18/21 18:12:20   Procedure changed from CT HIP LEFT WITHOUT IV CONTRAST     Final result                 Impression:    IMPRESSION:  Large rim-enhancing fluid collection within the anterolateral thigh/hip  musculature, lateral thigh subcutaneous soft tissues, and appearing to extend to  the anterior aspect of the femoral neck component of the total hip arthroplasty,  with evaluation limited due to streak artifact. The presence of rim enhancement  is suspicious for possible infected fluid collection, and clinical correlation  is recommended, including sampling of the fluid. There are also osseous changes  along the anterior proximal femur along the vertical femoral component  suspicious for associated osteomyelitis. See comment.                         Narrative:    CLINICAL HISTORY:      Left hip pain and fever status post left total  hip  arthroplasty 3/2/2021    TECHNIQUE: Unenhanced and enhanced CT axial images of the left hip were obtained  without IV contrast. Sagittal and coronal reformats were performed. 90 mL  Omnipaque 350 was administered intravenously without event.    CT DOSE:  One or more dose reduction techniques (e.g. automated exposure  control, adjustment of the mA and/or kV according to patient size, use of  iterative reconstruction technique) utilized for this examination.    COMPARISON:  Radiographs, 3/2/2021 and earlier today.    COMMENT:  There is a total left hip arthroplasty without evidence of dislocation or  fracture. Streak artifact limits evaluation of the joint space and surrounding  structures. There is a rim-enhancing fluid collection suspicious for  postoperative abscess. A component within the anterolateral thigh/hip  musculature measures 4.2 x 7.8 x 14 cm on axial series 4 image 78 and sagittal  image 80. This communicates for example on axial image 68 with a partially  visualized component within the superficial soft tissues of the lateral proximal  thigh, which measures 6 x ? x 18.5 cm, with the transverse measurement unknown  as the lateral soft tissues are not completely included in the field-of-view.  The fluid collection appears to track to the anterior aspect of the femoral neck  component of the hip arthroplasty, for example as seen on bone windows on axial  image 65 of series 4 and sagittal series 8 image 62, where there is a component  of the collection measuring 6 x 6.5 x 5.2 cm. There is associated fat stranding.  Given the rim enhancement, this is suspicious for a postoperative collection  which may be infected. Correlate clinically. There is thinning of the cortex  anteriorly along the proximal femur consistent with osteolysis at the vertical  femoral component with irregularity and sclerosis, for example as indicated with  arrows on image 76 of series 4, which suspicious for associated  osteomyelitis.                               CT CHEST PULMONARY EMBOLISM WITH IV CONTRAST (Final result)  Result time 03/18/21 18:20:04    Final result                 Impression:    IMPRESSION:  1.  No evidence of acute pulmonary embolism to the level of the proximal  segmental branches.  2.  Clear lungs.                 Narrative:    CLINICAL HISTORY:      Tachycardia, chest pain, shortness of breath, recent  total left hip arthroplasty, evaluate for acute pulmonary embolism    TECHNIQUE:   Enhanced CT examination of the chest was performed from the lung  apices through the adrenal glands including thin slices according to acute  pulmonary embolism protocol. 90 mL Omnipaque 350 was administered intravenously  without event.    CT DOSE:  One or more dose reduction techniques (e.g. automated exposure  control, adjustment of the mA and/or kV according to patient size, use of  iterative reconstruction technique) utilized for this examination.    COMPARISON:  None available    COMMENT:    VESSELS: There is no evidence of acute pulmonary embolism to the level of the  proximal segmental branches. The main pulmonary artery is normal in caliber.  The thoracic aorta is normal in caliber.  LUNGS AND PLEURA: There are mild dependent and platelike  hypoventilatory/atelectatic changes. There is no infiltrate.  No pleural effusion.  There is no pneumothorax.  AIRWAYS: The central airways are patent.  LOWER NECK: Visualized thyroid is unremarkable.  ESOPHAGUS: Within normal limits.  HEART AND MEDIASTINUM: Heart size within normal limits. No pericardial effusion.    LYMPH NODES: Within normal limits.  UPPER ABDOMEN: Limited visualization is within normal limits.  CHEST WALL: Within normal limits.  BONES: Degenerative changes.                               X-RAY HIP WITH OR WITHOUT PELVIS 2-3 VW LEFT (Final result)  Result time 03/18/21 17:53:07    Final result                 Impression:    IMPRESSION:    Total left hip  arthroplasty without evidence of hardware complication,  dislocation or fracture. Lateral soft tissue swelling.               Narrative:    CLINICAL HISTORY:       Left hip pain, status post left hip replacement 3/2/2021    COMMENT:  AP view of the pelvis and AP and lateral views of the left hip are  compared to 3/2/2021    There is a total left hip arthroplasty without evidence of hardware  complication, dislocation, or fracture. There are degenerative changes of the  lumbosacral spine, sacroiliac joints, and right hip joint. There is lateral soft  tissue swelling.                               US venous leg, LL extremity (Final result)  Result time 03/18/21 16:52:25    Final result                 Impression:    IMPRESSION:  No evidence for left-sided femoral-popliteal deep venous thrombosis.    I certify that I have personally reviewed this study and agree with this report.  Leslie Butt MD             Narrative:    CLINICAL HISTORY: Left lower extremity pain and swelling status post recent hip  replacement.    COMMENT: Ultrasound examination of the left lower extremity venous system is  performed utilizing gray scale, color and pulsed Doppler sonography.    Comparison: There are no prior examinations available for comparison.    There is normal response to compression within the left common femoral, femoral  and popliteal veins. Normal color-flow, venous waveforms and response to  augmentation is identified.  The peroneal and posterior tibial veins were also  examined in the calf and where visualized are free of thrombus.                                No orders to display       Scoring tools                               ED Course & MDM   MDM / ED COURSE and CLINICAL IMPRESSIONS     [facility]    ED Course as of Mar 23 0128   u Mar 18, 2021   1555 D/w ortho resident    [EK]   1601 Ortho requesting coags, T&S and COVID swab for possible surgery.     [EK]   1606 COS, signed out to Pam NGUYEN and ED attending pending  blood work, CT, u/s and ortho evaluation.    [EK]   1715 Ortho ordered CT right hip with and without contrast. Patient has left hip pain. Ordered changed to left side with and without contrast.     [SK]   1736 --  IMPRESSION:  No evidence for left-sided femoral-popliteal deep venous thrombosis.     I certify that I have personally reviewed this study and agree with this report.  Leslie Butt MD    venous leg, LL extremity [SK]   1823 IMPRESSION:  1.  No evidence of acute pulmonary embolism to the level of the proximal  segmental branches.  2.  Clear lungs.       [TAO]   1903 Discussed with ortho  Npo  Stop ac  No abx  Ir drain tomorrow  Admit to [facility]     [TAO]   1910 Accepted by [facility]     [TAO]      ED Course User Index  [EK] Emerald Nguyen PA C  [TAO] Kerwin Trejo,   [SK] Pam Sheppard PA C         Clinical Impressions as of Mar 23 0128   Postoperative surgical complication involving subcutaneous tissue associated with non-dermatologic procedure, unspecified complication            Emerald Nguyen PA C  03/18/21 1607       Emerald Nguyen PA C  03/23/21 0128

## 2021-03-18 NOTE — CONSULTS
Orthopedic Consult Note    Subjective     Sam Fitzgerald Chevalier is a 45 y.o. male  PMH  Anemia, bipolar disorder, DVT on Eliquis, HIV, pancreatitis, HARPER, MRSA bacteremia S/p L MIGUEL with Dr. Rick 3/2/2021 who presents to [facility] for evaluation of L hip. He reports approx  3 days of increasing erythema and swelling of his L thigh. He reports chills, denies L calf tenderness currently but reports he intermittently has had L calf tenderness. He reports N/v. He reports few days of L groin pain that is new in onset since surgery. He reports one episode of bumping into a wall with his L thigh approx 4 days ago. He reports hx of recurrent infections, as well as DVT in LLE, UE. He also endorses a few days of nightsweats. He reports his last dose of Eliquis was 3/18 AM. He has been ambulating with cane and reports he had been doing well with his rehab up until a few days prior to presentation.         Medical History:   Past Medical History:   Diagnosis Date   • Anemia     last transfusion 2017   • Bipolar 1 disorder (CMS/[facility])    • Deep vein thrombosis (CMS/HCC) 2009    last clot 2015 PE   • Depression    • HIV disease (CMS/[facility])    • Pancreatitis    • Sleep apnea 2004    lost 150lb  no cpap currently       Surgical History:   Past Surgical History:   Procedure Laterality Date   • ABDOMINOPLASTY  2006   • DENTAL SURGERY  2020    extractions     • GASTRIC BYPASS  2004       Social History:   Social History     Social History Narrative   • Not on file       Family History:   Family History   Adopted: Yes       Allergies: Patient has no known allergies.    Current Inpatient Medications   Medication Dose Route Frequency Provider Last Rate Last Admin   • iohexoL (OMNIPAQUE 350) 350 mg iodine/mL solution 130 mL  130 mL intravenous Once in imaging Pam Sheppard PA C            Medication List      ASK your doctor about these medications    apixaban 2.5 mg tablet  Commonly known as: ELIQUIS  Take 1 tablet (2.5 mg  total) by mouth 2 (two) times a day. (RESUME ON 3/10/21)  Dose: 2.5 mg     aspirin 325 mg EC tablet  Take 1 tablet (325 mg total) by mouth 2 (two) times a day. (LAST DOSE IS ON 3/9/21 IN PM)  Dose: 325 mg     atorvastatin 40 mg tablet  Commonly known as: LIPITOR  Take 40 mg by mouth every morning.  Dose: 40 mg     * buPROPion 100 mg tablet  Commonly known as: WELLBUTRIN  Take 100 mg by mouth daily. Patient takes 100 mg in the morning,  Dose: 100 mg     * buPROPion 75 mg tablet  Commonly known as: WELLBUTRIN  Take 75 mg by mouth daily as needed. Patient takes bupropion 100 mg daily in the morning, and 75 mg at lunch prn (two separate entries)  Dose: 75 mg     clonazePAM 0.5 mg tablet  Commonly known as: klonoPIN  Take 0.5 mg by mouth 2 (two) times a week (Sun, Wed). Pt takes as needed  Dose: 0.5 mg     cyclobenzaprine 5 mg tablet  Commonly known as: FLEXERIL  Take 1 tablet (5 mg total) by mouth nightly. TAKE AS DIRECTED BY DR CALVO  Dose: 5 mg     DESCOVY 200-25 mg tablet tablet  Take 1 tablet by mouth every morning.  Dose: 1 tablet  Generic drug: emtricitabine-tenofovir alafenamide     lamoTRIgine 200 mg tablet  Commonly known as: LaMICtal  Take 200 mg by mouth daily. 1 tablet daily  Dose: 200 mg     oxyCODONE 5 mg immediate release tablet  Commonly known as: ROXICODONE  TAKE AS DIRECTED BY DR CALVO WHO SENT A SCRIPT TO YOUR PHARMACY     pantoprazole 40 mg EC tablet  Commonly known as: PROTONIX  Take 40 mg by mouth every morning.  Dose: 40 mg     QUEtiapine 100 mg tablet  Commonly known as: SEROquel  Take 100 mg by mouth 2 (two) times a day. Pt takes nightly prn but prescribed bid  Dose: 100 mg     TIVICAY 50 mg tablet  Take 50 mg by mouth every morning.  Dose: 50 mg  Generic drug: dolutegravir         * This list has 2 medication(s) that are the same as other medications prescribed for you. Read the directions carefully, and ask your doctor or other care provider to review them with you.              Review of  Systems  Pertinent items are noted in HPI.    Objective   Labs  Labs are pending.          Physical Exam  LLE    Inspection: Significant erythema and fluctuance along incision  Palpation: Mildly TTP along incision  Strength: Fires iliopsoas, quadriceps, tibialis anterior, extensor hallucis longus, and gastrocsoleus complex, mild L groin pain with flexion, ER of LLE  Neurovascular: SILT tibial, sural, saphenous, SPN, and DPN distributions   ROM: full passive and active ROM, but mild L groin pain with logroll, passive flexion, ER of LLE        Assessment   45 y.o. male  PMH HIV, MRSA bacteremia, cellulitis, bipolar, s/p L MIGUEL 3/2021 with few days L thigh erythema, groin pain L thigh swelling.   Plan     - Case discussed with attending on call  - LLE DVT u/s negative  - WBC 7.6 , ESR 34, CRP <6  - CT LLE demonstrating rim enhancing lesion with c/f extension to femoral component  - Recommend IR aspiration of L hip joint as well as aspiration of L thigh fluid collection  - NPO pending aspiration results  - COVID  - Given not currently septic please hold abx pending aspiration results  - Hold Josesito Hazel MD

## 2021-03-19 ENCOUNTER — APPOINTMENT (INPATIENT)
Dept: RADIOLOGY | Facility: HOSPITAL | Age: 46
DRG: 920 | End: 2021-03-19
Attending: HOSPITALIST
Payer: COMMERCIAL

## 2021-03-19 PROBLEM — S70.02XA HEMATOMA OF LEFT HIP: Status: ACTIVE | Noted: 2021-03-19

## 2021-03-19 PROBLEM — L02.416 ABSCESS OF LEFT HIP: Status: ACTIVE | Noted: 2021-03-19

## 2021-03-19 LAB
ANION GAP SERPL CALC-SCNC: 8 MEQ/L (ref 3–15)
APPEARANCE SNV: CLEAR
BASOPHILS # BLD: 0.04 K/UL (ref 0.01–0.1)
BASOPHILS NFR BLD: 0.6 %
BODY FLD TYPE: ABNORMAL
BUN SERPL-MCNC: 13 MG/DL (ref 8–20)
CALCIUM SERPL-MCNC: 8.7 MG/DL (ref 8.9–10.3)
CHLORIDE SERPL-SCNC: 105 MEQ/L (ref 98–109)
CO2 SERPL-SCNC: 26 MEQ/L (ref 22–32)
COLOR SNV: ABNORMAL
CREAT SERPL-MCNC: 0.8 MG/DL (ref 0.8–1.3)
CRYSTALS SNV MICRO: NORMAL
DIFFERENTIAL METHOD BLD: ABNORMAL
EOSINOPHIL # BLD: 0.1 K/UL (ref 0.04–0.54)
EOSINOPHIL NFR BLD: 1.4 %
ERYTHROCYTE [DISTWIDTH] IN BLOOD BY AUTOMATED COUNT: 14.6 % (ref 11.6–14.4)
GFR SERPL CREATININE-BSD FRML MDRD: >60 ML/MIN/1.73M*2
GLUCOSE SERPL-MCNC: 79 MG/DL (ref 70–99)
HCT VFR BLDCO AUTO: 36.8 % (ref 40.1–51)
HGB BLD-MCNC: 11.8 G/DL (ref 13.7–17.5)
IMM GRANULOCYTES # BLD AUTO: 0.03 K/UL (ref 0–0.08)
IMM GRANULOCYTES NFR BLD AUTO: 0.4 %
LYMPHOCYTES # BLD: 2.83 K/UL (ref 1.2–3.5)
LYMPHOCYTES NFR BLD: 39.5 %
LYMPHOCYTES NFR FLD MANUAL: 10 %
MCH RBC QN AUTO: 30.1 PG (ref 28–33.2)
MCHC RBC AUTO-ENTMCNC: 32.1 G/DL (ref 32.2–36.5)
MCV RBC AUTO: 93.9 FL (ref 83–98)
MONOCYTES # BLD: 0.61 K/UL (ref 0.3–1)
MONOCYTES NFR BLD: 8.5 %
MONOS+MACROS NFR FLD MANUAL: 55 %
NEUTROPHILS # BLD: 3.56 K/UL (ref 1.7–7)
NEUTROPHILS NFR FLD MANUAL: 35 %
NEUTS SEG NFR BLD: 49.6 %
NRBC BLD-RTO: 0 %
PDW BLD AUTO: 9.1 FL (ref 9.4–12.4)
PLATELET # BLD AUTO: 409 K/UL (ref 150–350)
POTASSIUM SERPL-SCNC: 4.2 MEQ/L (ref 3.6–5.1)
RBC # BLD AUTO: 3.92 M/UL (ref 4.5–5.8)
RBC # SNV: ABNORMAL CELLS/CU MM (ref 0–10000)
SODIUM SERPL-SCNC: 139 MEQ/L (ref 136–144)
SPECIMEN SOURCE: ABNORMAL
WBC # BLD AUTO: 7.17 K/UL (ref 3.8–10.5)
WBC # SNV AUTO: 1201 CELLS/CU MM (ref 0–200)

## 2021-03-19 PROCEDURE — 80048 BASIC METABOLIC PNL TOTAL CA: CPT | Performed by: HOSPITALIST

## 2021-03-19 PROCEDURE — 200200 PR NO CHARGE: Performed by: HOSPITALIST

## 2021-03-19 PROCEDURE — 27200000 HC STERILE SUPPLY

## 2021-03-19 PROCEDURE — 89050 BODY FLUID CELL COUNT: CPT | Performed by: NURSE PRACTITIONER

## 2021-03-19 PROCEDURE — 36415 COLL VENOUS BLD VENIPUNCTURE: CPT | Performed by: HOSPITALIST

## 2021-03-19 PROCEDURE — 89060 EXAM SYNOVIAL FLUID CRYSTALS: CPT | Performed by: NURSE PRACTITIONER

## 2021-03-19 PROCEDURE — 63700000 HC SELF-ADMINISTRABLE DRUG: Performed by: HOSPITALIST

## 2021-03-19 PROCEDURE — 85025 COMPLETE CBC W/AUTO DIFF WBC: CPT | Performed by: HOSPITALIST

## 2021-03-19 PROCEDURE — 12000000 HC ROOM AND CARE MED/SURG

## 2021-03-19 PROCEDURE — 36100330 US GUIDED NEEDLE PLACEMENT

## 2021-03-19 PROCEDURE — 63600000 HC DRUGS/DETAIL CODE: Performed by: HOSPITALIST

## 2021-03-19 PROCEDURE — 87205 SMEAR GRAM STAIN: CPT | Performed by: NURSE PRACTITIONER

## 2021-03-19 PROCEDURE — 99233 SBSQ HOSP IP/OBS HIGH 50: CPT | Performed by: HOSPITALIST

## 2021-03-19 RX ORDER — ACETAMINOPHEN 325 MG/1
650 TABLET ORAL EVERY 6 HOURS PRN
Status: DISCONTINUED | OUTPATIENT
Start: 2021-03-19 | End: 2021-03-22 | Stop reason: HOSPADM

## 2021-03-19 RX ORDER — ONDANSETRON HYDROCHLORIDE 2 MG/ML
4 INJECTION, SOLUTION INTRAVENOUS EVERY 6 HOURS PRN
Status: DISCONTINUED | OUTPATIENT
Start: 2021-03-19 | End: 2021-03-22 | Stop reason: HOSPADM

## 2021-03-19 RX ORDER — HYDROMORPHONE HYDROCHLORIDE 1 MG/ML
0.25 INJECTION, SOLUTION INTRAMUSCULAR; INTRAVENOUS; SUBCUTANEOUS ONCE
Status: COMPLETED | OUTPATIENT
Start: 2021-03-19 | End: 2021-03-19

## 2021-03-19 RX ORDER — OXYCODONE HYDROCHLORIDE 5 MG/1
15 TABLET ORAL EVERY 4 HOURS PRN
Status: DISCONTINUED | OUTPATIENT
Start: 2021-03-19 | End: 2021-03-22 | Stop reason: HOSPADM

## 2021-03-19 RX ADMIN — CLONAZEPAM 0.5 MG: 0.5 TABLET ORAL at 16:08

## 2021-03-19 RX ADMIN — APIXABAN 2.5 MG: 2.5 TABLET, FILM COATED ORAL at 20:03

## 2021-03-19 RX ADMIN — HYDROMORPHONE HYDROCHLORIDE 0.5 MG: 1 INJECTION, SOLUTION INTRAMUSCULAR; INTRAVENOUS; SUBCUTANEOUS at 06:52

## 2021-03-19 RX ADMIN — ACETAMINOPHEN 975 MG: 325 TABLET, FILM COATED ORAL at 09:05

## 2021-03-19 RX ADMIN — ACETAMINOPHEN 650 MG: 325 TABLET, FILM COATED ORAL at 20:03

## 2021-03-19 RX ADMIN — HYDROMORPHONE HYDROCHLORIDE 0.5 MG: 1 INJECTION, SOLUTION INTRAMUSCULAR; INTRAVENOUS; SUBCUTANEOUS at 11:26

## 2021-03-19 RX ADMIN — PANTOPRAZOLE SODIUM 40 MG: 40 TABLET, DELAYED RELEASE ORAL at 09:06

## 2021-03-19 RX ADMIN — LIDOCAINE 1 PATCH: 246 PATCH TOPICAL at 09:05

## 2021-03-19 RX ADMIN — CYCLOBENZAPRINE HYDROCHLORIDE 5 MG: 5 TABLET, FILM COATED ORAL at 21:47

## 2021-03-19 RX ADMIN — LAMOTRIGINE 200 MG: 200 TABLET ORAL at 09:06

## 2021-03-19 RX ADMIN — OXYCODONE HYDROCHLORIDE 15 MG: 5 TABLET ORAL at 18:55

## 2021-03-19 RX ADMIN — ATORVASTATIN CALCIUM 40 MG: 40 TABLET, FILM COATED ORAL at 09:06

## 2021-03-19 RX ADMIN — OXYCODONE HYDROCHLORIDE 15 MG: 5 TABLET ORAL at 22:59

## 2021-03-19 RX ADMIN — BUPROPION HYDROCHLORIDE 100 MG: 100 TABLET ORAL at 09:06

## 2021-03-19 RX ADMIN — HYDROMORPHONE HYDROCHLORIDE 0.25 MG: 1 INJECTION, SOLUTION INTRAMUSCULAR; INTRAVENOUS; SUBCUTANEOUS at 03:50

## 2021-03-19 RX ADMIN — ONDANSETRON 4 MG: 2 INJECTION INTRAMUSCULAR; INTRAVENOUS at 18:56

## 2021-03-19 RX ADMIN — ACETAMINOPHEN 975 MG: 325 TABLET, FILM COATED ORAL at 13:25

## 2021-03-19 RX ADMIN — HYDROMORPHONE HYDROCHLORIDE 0.5 MG: 1 INJECTION, SOLUTION INTRAMUSCULAR; INTRAVENOUS; SUBCUTANEOUS at 14:40

## 2021-03-19 RX ADMIN — ACETAMINOPHEN 975 MG: 325 TABLET, FILM COATED ORAL at 02:16

## 2021-03-19 RX ADMIN — ONDANSETRON 4 MG: 2 INJECTION INTRAMUSCULAR; INTRAVENOUS at 11:59

## 2021-03-19 RX ADMIN — HYDROMORPHONE HYDROCHLORIDE 0.5 MG: 1 INJECTION, SOLUTION INTRAMUSCULAR; INTRAVENOUS; SUBCUTANEOUS at 02:54

## 2021-03-19 NOTE — PROGRESS NOTES
S/p US guided aspiration of the lateral aspect of the left hip collection. 370 cc of cloudy serosanguinous fluid removed and sent for the requested studies  I d.w ortho team regarding starting abx as recommended per dr. leana RIZO. Ortho team to review fluid/cell count results. Recommend hold abx for now until OR plan determined as possible need to take more cultures intraop.

## 2021-03-19 NOTE — PROGRESS NOTES
I antoni.w dr. Martinez regarding pt's aspirated fluid result -> not consistent w an infection but more consistent w hematoma. No indication for abx per dr. vang. OK to resume eliquis from dr. martinez stand point given pt's hx of DVT.   Will monitor pt overnight w eliquis resumed + monitor for any drop in hgb/active bleeding and monitor pt's pain management w switching over his pain meds from IV to PO. [place] narcotic database queried and reviewed.   I juan miguel pt + RN

## 2021-03-19 NOTE — CONSULTS
Infectious Disease Consult Note    Patient Name: Sam Fitzgerald Chevalier  MR#: [id number]  : 1975  Admission Date: 3/18/2021  Consult Date: 21 2:06 PM   Consultant: Anselmo Owens MD    Reason for Consult: Large rim-enhancing fluid collection within the anterolateral thigh/hip musculature  Referring Provider: Dr. Rodriguez        Sam Fitzgerald Chevalier is a 45 y.o. male who was admitted on 3/18/2021.  This patient with history of well-controlled HIV undetectable viral load high CD4 count self-reported follows at [facility].  He underwent hip replacement with Dr. Rick developed pain swelling erythema he does not have a fever or leukocytosis his CRP is low  Independent review of CT of the chest with IV contrast shows normal no PE and independent review of the CT of the left hip with IV contrast shows a large rim-enhancing fluid collection in the left hip communicating with the joint    Allergies: No Known Allergies    Medical History:   Past Medical History:   Diagnosis Date   • Alcohol abuse    • Anemia     last transfusion    • Bipolar 1 disorder (CMS/HCC)    • Deep vein thrombosis (CMS/HCC)     last clot  PE   • Depression    • Drug abuse (CMS/HCC)    • History of pulmonary embolus (PE)    • HIV disease (CMS/HCC)    • Pancreatitis    • Sleep apnea 2004    lost 150lb  no cpap currently       Surgical History:   Past Surgical History:   Procedure Laterality Date   • ABDOMINOPLASTY     • DENTAL SURGERY      extractions     • GASTRIC BYPASS     • JOINT REPLACEMENT      left hip replacement       Social History     Tobacco Use   • Smoking status: Former Smoker     Packs/day: 1.00     Years: 22.00     Pack years: 22.00     Types: Cigarettes     Quit date: 2021     Years since quittin.2   • Smokeless tobacco: Never Used   • Tobacco comment: smokes every once in a while, few cigs on weekend   Substance Use Topics   • Alcohol use: Not Currently     Alcohol/week: 3.0  - 4.0 standard drinks     Types: 3 - 4 Shots of liquor per week     Frequency: 2-4 times a month   • Drug use: Not Currently     Types: Heroin, Codeine, IV, Marijuana     Comment: no longer uses heroin or cocaine , medical marijuana every few days       Family History:   Family History   Adopted: Yes       Review of Systems    All other systems reviewed and negative except as noted in the HPI.    Medications:    Current IP Meds (From admission, onward)        Frequency     ondansetron (ZOFRAN) injection 4 mg      Every 6 hours PRN     atorvastatin (LIPITOR) tablet 40 mg      Every morning     buPROPion (WELLBUTRIN) tablet 100 mg      Daily     dolutegravir (TIVICAY) tablet 50 mg     Note to Pharmacy: If patient has his own ok to take his own    Every morning     emtricitabine-tenofovir alafenamide (DESCOVY) 200-25 mg tablet 1 tablet     Note to Pharmacy: Ok for patient to take his own if he has with him    Every morning     lamoTRIgine (LaMICtal) tablet 200 mg      Daily     pantoprazole (PROTONIX) tablet,delayed release (DR/EC) 40 mg      Every morning     HYDROmorphone (DILAUDID) injection 0.25 mg      Once     [Provider Managed Hold]  apixaban (ELIQUIS) tablet 2.5 mg     (Provider Managed Hold since Thu 3/18/2021 at 2121 by Li Brown RN.Hold Reason: Awaiting Consultant input.)    2 times daily     cyclobenzaprine (FLEXERIL) tablet 5 mg      Nightly     clonazePAM (klonoPIN) tablet 0.5 mg     Note to Pharmacy: PT CAN ONLY HAVE PRN 2 DAYS PER WEEK    As needed     buPROPion (WELLBUTRIN) tablet 75 mg      Daily PRN     acetaminophen (TYLENOL) tablet 975 mg  (Analgesics - Scheduled medications)      Every 6 hours interval     lidocaine (ASPERCREME) 4 % topical patch 1 patch  (Analgesics - Scheduled medications)      Daily     HYDROmorphone (DILAUDID) injection 0.5 mg  (Analgesics - PRN Opiates)      Every 3 hours PRN     glucose chewable tablet 16-32 g of dextrose  (Hypoglycemia Treatment Protocol and  Hyperglycemia Validation Protocol)      As needed     dextrose 40 % oral gel 15-30 g of dextrose  (Hypoglycemia Treatment Protocol and Hyperglycemia Validation Protocol)      As needed     glucagon (GLUCAGEN) injection 1 mg  (Hypoglycemia Treatment Protocol and Hyperglycemia Validation Protocol)      As needed     dextrose in water injection 12.5 g  (Hypoglycemia Treatment Protocol and Hyperglycemia Validation Protocol)      As needed     iohexoL (OMNIPAQUE 350) 350 mg iodine/mL solution 130 mL      Once in imaging     iohexoL (OMNIPAQUE 350) 350 mg iodine/mL solution 130 mL      Once in imaging     HYDROmorphone (DILAUDID) injection 0.5 mg      Once     HYDROmorphone (DILAUDID) injection 0.5 mg      Once     sodium chloride 0.9 % bolus 500 mL      Once          Anti-infectives (From admission, onward)    Start     Dose/Rate Route Frequency Ordered Stop    21 0900  dolutegravir (TIVICAY) tablet 50 mg     Note to Pharmacy: If patient has his own ok to take his own    50 mg oral Every morning 21 0900  emtricitabine-tenofovir alafenamide (DESCOVY) 200-25 mg tablet 1 tablet     Note to Pharmacy: Ok for patient to take his own if he has with him    1 tablet oral Every morning 21              Vital Signs:    Temp:  [36.7 °C (98.1 °F)-36.9 °C (98.5 °F)] 36.7 °C (98.1 °F)  Heart Rate:  [70-96] 70  Resp:  [16-26] 16  BP: (116-140)/(61-94) 116/61    Temp (72hrs), Av.8 °C (98.3 °F), Min:36.7 °C (98.1 °F), Max:36.9 °C (98.5 °F)      Physical Exam     Gen: Aox3  HEENT: OP clear  Neck: Supple  LAD: No cervical LAD  Lungs: CTAB  CV: RRR no murmurs  Abd: Soft NTND +BS  Ext: Left hip swelling erythema fluctuance  Skin: no rash  Neuro: II-XII intact        Lines, Drains, Airways, Wounds:  Peripheral IV 21 Left Arm (Active)   Number of days: 1       Surgical Incision Hip Left (Active)   Number of days: 17       Labs:    Lab Results   Component Value Date    WBC 7.17 2021    HGB  11.8 (L) 03/19/2021    HCT 36.8 (L) 03/19/2021    MCV 93.9 03/19/2021     (H) 03/19/2021     Lab Results   Component Value Date    GLUCOSE 79 03/19/2021    CALCIUM 8.7 (L) 03/19/2021     03/19/2021    K 4.2 03/19/2021    CO2 26 03/19/2021     03/19/2021    BUN 13 03/19/2021    CREATININE 0.8 03/19/2021     No results found for: ALT, AST, GGT, ALKPHOS, BILITOT  UA Results       03/18/21 2125           Color Yellow           Clarity Clear           Glucose Negative           Bilirubin Negative           Ketones Negative           Sp Grav >1.035           Blood Negative           Ph 6.0           Protein Negative           Urobilinogen 1.0           Nitrite Negative           Leuk Est Negative           Comment for Blood at 2125 on 03/18/21: The sensitivity of the occult blood test is equivalent to approximately 4 intact RBC/HPF.    Comment for Leuk Est at 2125 on 03/18/21: Results can be falsely negative due to high specific gravity, some antibiotics, glucose >3 g/dl, or WBC other than neutrophils.        Microbiology Results     Procedure Component Value Units Date/Time    SARS-CoV-2 (COVID-19), PCR Nasopharynx [[id number]]  (Normal) Collected: 03/18/21 1639    Specimen: Nasopharyngeal Swab from Nasopharynx Updated: 03/18/21 1754    Narrative:      The following orders were created for panel order SARS-CoV-2 (COVID-19), PCR Nasopharynx.  Procedure                               Abnormality         Status                     ---------                               -----------         ------                     SARS-CoV-2 (COVID-19), P...[[id number]]  Normal              Final result                 Please view results for these tests on the individual orders.    SARS-CoV-2 (COVID-19), PCR Nasopharynx [[id number]]  (Normal) Collected: 03/18/21 1639    Specimen: Nasopharyngeal Swab from Nasopharynx Updated: 03/18/21 1754     SARS-CoV-2 (COVID-19) Negative    SARS-CoV-2 (COVID-19),  PCR Nasopharynx [[id number]]  (Normal) Collected: 03/03/21 0516    Specimen: Nasopharyngeal Swab from Nasopharynx Updated: 03/03/21 0800    Narrative:      The following orders were created for panel order SARS-CoV-2 (COVID-19), PCR Nasopharynx.  Procedure                               Abnormality         Status                     ---------                               -----------         ------                     SARS-CoV-2 (COVID-19), P...[[id number]]  Normal              Final result                 Please view results for these tests on the individual orders.    SARS-CoV-2 (COVID-19), PCR Nasopharynx [[id number]]  (Normal) Collected: 03/03/21 0516    Specimen: Nasopharyngeal Swab from Nasopharynx Updated: 03/03/21 0800     SARS-CoV-2 (COVID-19) Negative           Pathology Results     ** No results found for the last 720 hours. **          Echo:          Imaging:    Radiology Imaging   XR HIP WITH PELVIS 2-3 VW LEFT    Narrative CLINICAL HISTORY:       Left hip pain, status post left hip replacement 3/2/2021    COMMENT:  AP view of the pelvis and AP and lateral views of the left hip are  compared to 3/2/2021    There is a total left hip arthroplasty without evidence of hardware  complication, dislocation, or fracture. There are degenerative changes of the  lumbosacral spine, sacroiliac joints, and right hip joint. There is lateral soft  tissue swelling.      Impression IMPRESSION:    Total left hip arthroplasty without evidence of hardware complication,  dislocation or fracture. Lateral soft tissue swelling.         Patient Active Problem List   Diagnosis Code   • DJD (degenerative joint disease) M19.90   • Encounter for pre-operative cardiovascular clearance Z01.810   • HARPER (obstructive sleep apnea) G47.33   • DVT (deep venous thrombosis) (CMS/[facility]) I82.409   • Bipolar 1 disorder (CMS/[facility]) F31.9   • Chronic pancreatitis (CMS/[facility]) K86.1   • Asymptomatic HIV infection (CMS/[facility]) Z21   • Counseling on health  promotion and disease prevention Z71.89   • Hyperlipidemia E78.5   • OA (osteoarthritis) of hip M16.9   • Postoperative surgical complication involving subcutaneous tissue associated with non-dermatologic procedure L76.82   • Diarrhea R19.7   • Abscess of left hip L02.416     Collection of left hip  Assessment & Plan  Ortho recommending IR aspiration  Do not start antibiotics until after specimen is collected and then we can start Rocephin 2 g daily and vancomycin dosed by pharmacy  Further recommendations to follow based on results    Asymptomatic HIV infection (CMS/[facility])  Assessment & Plan  Follows at [facility]  Reports being undetectable with high CD4 count  Continue Samuel Owens MD  3/19/2021 2:06 PM

## 2021-03-19 NOTE — ASSESSMENT & PLAN NOTE
-Patient reports that his viral load is currently undetectable  -Continue descovy and tivicay- patient reports he is able to bring his medications in from home

## 2021-03-19 NOTE — PROGRESS NOTES
Hospital Medicine Service -  Daily Progress Note       SUBJECTIVE   Interval History: pt seen and examined. Resting in bed. No chest pain/dyspnea.      OBJECTIVE      Vital signs in last 24 hours:  Temp:  [36.7 °C (98.1 °F)-36.9 °C (98.5 °F)] 36.7 °C (98.1 °F)  Heart Rate:  [70-96] 70  Resp:  [16-26] 16  BP: (116-140)/(61-94) 116/61    Intake/Output Summary (Last 24 hours) at 3/19/2021 1014  Last data filed at 3/18/2021 1627  Gross per 24 hour   Intake 500 ml   Output --   Net 500 ml       PHYSICAL EXAMINATION      Physical Exam  Constitutional:       Appearance: He is not ill-appearing, toxic-appearing or diaphoretic.   Eyes:      General: No scleral icterus.  Cardiovascular:      Rate and Rhythm: Regular rhythm.      Heart sounds: No friction rub. No gallop.    Pulmonary:      Effort: Pulmonary effort is normal. No respiratory distress.      Breath sounds: No stridor. No wheezing, rhonchi or rales.   Abdominal:      General: Bowel sounds are normal. There is no distension.      Palpations: Abdomen is soft.      Tenderness: There is no abdominal tenderness. There is no guarding.   Neurological:      Mental Status: He is alert and oriented to person, place, and time.   Psychiatric:         Mood and Affect: Mood normal.         Thought Content: Thought content normal.         Judgment: Judgment normal.        LINES, CATHETERS, DRAINS, AIRWAYS, AND WOUNDS   Lines, Drains, Airways, Wounds:  Peripheral IV 03/18/21 Left Arm (Active)   Number of days: 1       Surgical Incision Hip Left (Active)   Number of days: 17       Comments:      LABS / IMAGING / TELE      Labs  Results from last 7 days   Lab Units 03/19/21  0306   WBC K/uL 7.17   HEMOGLOBIN g/dL 11.8*   HEMATOCRIT % 36.8*   PLATELETS K/uL 409*     Results from last 7 days   Lab Units 03/19/21  0306   SODIUM mEQ/L 139   POTASSIUM mEQ/L 4.2   CHLORIDE mEQ/L 105   CO2 mEQ/L 26   BUN mg/dL 13   CREATININE mg/dL 0.8   CALCIUM mg/dL 8.7*   GLUCOSE mg/dL 79        IMAGING  Ct Hip Left With And Without Iv Contrast    Result Date: 3/18/2021  IMPRESSION: Large rim-enhancing fluid collection within the anterolateral thigh/hip musculature, lateral thigh subcutaneous soft tissues, and appearing to extend to the anterior aspect of the femoral neck component of the total hip arthroplasty, with evaluation limited due to streak artifact. The presence of rim enhancement is suspicious for possible infected fluid collection, and clinical correlation is recommended, including sampling of the fluid. There are also osseous changes along the anterior proximal femur along the vertical femoral component suspicious for associated osteomyelitis. See comment.     Ct Chest Pulmonary Embolism With Iv Contrast    Result Date: 3/18/2021  IMPRESSION: 1.  No evidence of acute pulmonary embolism to the level of the proximal segmental branches. 2.  Clear lungs.     Us Venous Leg, Ll Extremity    Result Date: 3/18/2021  IMPRESSION: No evidence for left-sided femoral-popliteal deep venous thrombosis. I certify that I have personally reviewed this study and agree with this report. Leslie Butt MD    X-ray Hip With Or Without Pelvis 1 Vw Left    Result Date: 3/2/2021  IMPRESSION: Status post left hip arthroplasty.    X-ray Hip With Or Without Pelvis 2-3 Vw Left    Result Date: 3/18/2021  IMPRESSION: Total left hip arthroplasty without evidence of hardware complication, dislocation or fracture. Lateral soft tissue swelling.       ASSESSMENT AND PLAN      Diarrhea  Assessment & Plan  -no recent abx use or travel  -patient reports 3 episodes of watery stools for the past few days  -supportive care for now    Hyperlipidemia  Assessment & Plan  -continue statin    Asymptomatic HIV infection (CMS/HCC)  Assessment & Plan  -Patient reports that his viral load is currently undetectable  -Continue descovy and tivicay- patient reports he is able to bring his medications in from home    Chronic pancreatitis  (CMS/[facility])  Assessment & Plan  -takes morphine as an outpatient   -PDMP reviewed, will hold morphine for now and use dilaudid prn    Bipolar 1 disorder (CMS/[facility])  Assessment & Plan  -Continue Wellbutrin, patient reports that he is no longer taking Seroquel    DVT (deep venous thrombosis) (CMS/[facility])  Assessment & Plan  -hx of DVT in 2015  -hold Eliquis for now , would resume as soon as able     HARPER (obstructive sleep apnea)  Assessment & Plan  -no longer uses CPAP     * Postoperative surgical complication involving subcutaneous tissue associated with non-dermatologic procedure  Assessment & Plan  -s/p L MIGUEL by Dr. Rick on 3-2-21  -now with erythema, swelling and warmth surrounding incision site  -CT L hip shows: Large rim-enhancing fluid collection within the anterolateral thigh/hip musculature, lateral thigh subcutaneous soft tissues, and appearing to extend to the anterior aspect of the femoral neck component of the total hip arthroplasty,  -Appreciate Ortho recommendations, plan for IR aspiration.   -We will hold off on antibiotics for now as patient is not septic  -Blood cultures pending  -Tylenol around-the-clock, lidocaine patch and Dilaudid as needed for breakthrough pain  -hold eliquis for now, resume as able         VTE Prophylaxis Plan: will resume eliquis once OK w ortho.   Code Status: Full Code  Estimated Discharge Date: 3/22/2021     Franco Rodriguez,   3/19/2021

## 2021-03-19 NOTE — PLAN OF CARE
Problem: Adult Inpatient Plan of Care  Goal: Readiness for Transition of Care  Outcome: Progressing     Problem: Adult Inpatient Plan of Care  Goal: Readiness for Transition of Care  Intervention: Mutually Develop Transition Plan  Flowsheets (Taken 3/19/2021 1430)  Anticipated Discharge Disposition: home without services  Equipment Needed After Discharge: cane, straight  Assistive Device/Animal Currently Used at Home: cane, straight  Concerns Comments: Met with patient at bedside. Independent with adls. Ambulates with assist of cane due to recent hip replacement. Lives in 2 story home. Has a roomate. Has very little contact as roomate is not home most of the time. Patient drove himself to ER. No in home or out patient services. PCP Dr Jenae Beck. Pharmacy [facility] [id number]. Plans to return home at discharge. No discharge planning needs identified at this time.   Transportation Concerns: car, none  Current Discharge Risk: lives alone  Readmission Within the Last 30 Days: no previous admission in last 30 days  Patient/Family Anticipated Services at Transition: none  Patient/Family Anticipates Transition to: home  Transportation Anticipated: car, drives self  Concerns to be Addressed: discharge planning

## 2021-03-19 NOTE — ASSESSMENT & PLAN NOTE
-takes morphine as an outpatient   -PDMP reviewed, will hold morphine for now and use dilaudid prn

## 2021-03-19 NOTE — ASSESSMENT & PLAN NOTE
-no recent abx use or travel  -patient reports 3 episodes of watery stools for the past few days  -supportive care for now

## 2021-03-19 NOTE — ASSESSMENT & PLAN NOTE
Follows at [facility]  Reports being undetectable with high CD4 count  Continue Descovy and Tivicay

## 2021-03-19 NOTE — POST-PROCEDURE NOTE
Interventional Radiology Brief Postprocedure Note    Sam Fitzgerald Chevalier     Attending: Moses Bradley DO    Assistant: Moris    Diagnosis: L hip collection    Description of procedure: US guided aspiration L hip collection    Contrast: none     Anesthesia:  Local    Medications: 1% lidocaine     Complications: None      Estimated Blood Loss: Estimated Blood Loss: 0-10 ml    Anticoagulation: Can resume as needed.    Specimens: 370 cc cloudy serosanginous fluid from L hip collection    Findings: US guided aspiration of the lateral aspect of the left hip collection. 370 cc of cloudy serosanguinous fluid removed and sent for the requested studies. No significant residual fluid remaining.     3/19/2021 2:26 PM

## 2021-03-19 NOTE — PATIENT CARE CONFERENCE
Care Progression Rounds Note  Date: 3/19/2021  Time: 10:11 AM     Patient Name: Sam Fitzgerald Chevalier     Medical Record Number: [id number]   YOB: 1975  Sex: Male      Room/Bed: 5428    Admitting Diagnosis: Postoperative surgical complication involving subcutaneous tissue associated with non-dermatologic procedure, unspecified complication [L76.82]   Admit Date/Time: 3/18/2021  2:54 PM    Primary Diagnosis: Postoperative surgical complication involving subcutaneous tissue associated with non-dermatologic procedure  Principal Problem: Postoperative surgical complication involving subcutaneous tissue associated with non-dermatologic procedure    GMLOS: pending  Anticipated Discharge Date: 3/22/2021    AM-PAC  Mobility Score:      Discharge Planning:       Barriers to Discharge:  Barriers to Discharge: Medical issues not resolved, Test pending    Participants:  advanced practice provider, , nursing, social work/services, physical therapy   Please set of stress echo.   The patient has more chest pain or shortness of breath tell her to go to emergency room

## 2021-03-19 NOTE — DISCHARGE INSTRUCTIONS
Wound instructions Please leave ACE wrap in place until you are called by Dr. Hickman's nurse on Monday. Please keep wound dry. You will then be seen in Dr. Hickman's office on Thursday 3/25/2021

## 2021-03-19 NOTE — ASSESSMENT & PLAN NOTE
Ortho recommending IR aspiration  Do not start antibiotics after specimen is collected and then we can start Rocephin 2 g daily and vancomycin dosed by pharmacy  Further recommendations to follow based on results

## 2021-03-19 NOTE — ASSESSMENT & PLAN NOTE
-s/p L MIGUEL by Dr. Rick on 3-2-21  -now with erythema, swelling and warmth surrounding incision site  -CT L hip shows: Large rim-enhancing fluid collection within the anterolateral thigh/hip musculature, lateral thigh subcutaneous soft tissues, and appearing to extend to the anterior aspect of the femoral neck component of the total hip arthroplasty,  -Appreciate Ortho recommendations, plan for IR aspiration.   -We will hold off on antibiotics for now as patient is not septic  -Blood cultures pending  -Tylenol around-the-clock, lidocaine patch and Dilaudid as needed for breakthrough pain  -hold eliquis for now, resume as able

## 2021-03-20 LAB
ALBUMIN SERPL-MCNC: 3.1 G/DL (ref 3.4–5)
ALP SERPL-CCNC: 99 IU/L (ref 35–126)
ALT SERPL-CCNC: 21 IU/L (ref 16–63)
ANION GAP SERPL CALC-SCNC: 8 MEQ/L (ref 3–15)
AST SERPL-CCNC: 19 IU/L (ref 15–41)
BILIRUB SERPL-MCNC: 0.3 MG/DL (ref 0.3–1.2)
BUN SERPL-MCNC: 17 MG/DL (ref 8–20)
CALCIUM SERPL-MCNC: 8.9 MG/DL (ref 8.9–10.3)
CHLORIDE SERPL-SCNC: 104 MEQ/L (ref 98–109)
CO2 SERPL-SCNC: 28 MEQ/L (ref 22–32)
CREAT SERPL-MCNC: 0.9 MG/DL (ref 0.8–1.3)
ERYTHROCYTE [DISTWIDTH] IN BLOOD BY AUTOMATED COUNT: 14.4 % (ref 11.6–14.4)
GFR SERPL CREATININE-BSD FRML MDRD: >60 ML/MIN/1.73M*2
GLUCOSE SERPL-MCNC: 95 MG/DL (ref 70–99)
HCT VFR BLDCO AUTO: 37.3 % (ref 40.1–51)
HGB BLD-MCNC: 12 G/DL (ref 13.7–17.5)
MAGNESIUM SERPL-MCNC: 1.9 MG/DL (ref 1.8–2.5)
MCH RBC QN AUTO: 30.1 PG (ref 28–33.2)
MCHC RBC AUTO-ENTMCNC: 32.2 G/DL (ref 32.2–36.5)
MCV RBC AUTO: 93.5 FL (ref 83–98)
PDW BLD AUTO: 8.8 FL (ref 9.4–12.4)
PLATELET # BLD AUTO: 375 K/UL (ref 150–350)
POTASSIUM SERPL-SCNC: 4.1 MEQ/L (ref 3.6–5.1)
PROT SERPL-MCNC: 6.3 G/DL (ref 6–8.2)
RBC # BLD AUTO: 3.99 M/UL (ref 4.5–5.8)
SODIUM SERPL-SCNC: 140 MEQ/L (ref 136–144)
WBC # BLD AUTO: 7.78 K/UL (ref 3.8–10.5)

## 2021-03-20 PROCEDURE — 63700000 HC SELF-ADMINISTRABLE DRUG: Performed by: HOSPITALIST

## 2021-03-20 PROCEDURE — 63600000 HC DRUGS/DETAIL CODE: Performed by: HOSPITALIST

## 2021-03-20 PROCEDURE — 36415 COLL VENOUS BLD VENIPUNCTURE: CPT | Performed by: HOSPITALIST

## 2021-03-20 PROCEDURE — 99232 SBSQ HOSP IP/OBS MODERATE 35: CPT | Performed by: HOSPITALIST

## 2021-03-20 PROCEDURE — 80053 COMPREHEN METABOLIC PANEL: CPT | Performed by: HOSPITALIST

## 2021-03-20 PROCEDURE — 83735 ASSAY OF MAGNESIUM: CPT | Performed by: HOSPITALIST

## 2021-03-20 PROCEDURE — 85027 COMPLETE CBC AUTOMATED: CPT | Performed by: HOSPITALIST

## 2021-03-20 PROCEDURE — 12000000 HC ROOM AND CARE MED/SURG

## 2021-03-20 RX ORDER — HYDROMORPHONE HYDROCHLORIDE 1 MG/ML
0.5 INJECTION, SOLUTION INTRAMUSCULAR; INTRAVENOUS; SUBCUTANEOUS EVERY 4 HOURS PRN
Status: DISCONTINUED | OUTPATIENT
Start: 2021-03-20 | End: 2021-03-22 | Stop reason: HOSPADM

## 2021-03-20 RX ADMIN — OXYCODONE HYDROCHLORIDE 15 MG: 5 TABLET ORAL at 03:49

## 2021-03-20 RX ADMIN — OXYCODONE HYDROCHLORIDE 15 MG: 5 TABLET ORAL at 13:05

## 2021-03-20 RX ADMIN — LAMOTRIGINE 200 MG: 200 TABLET ORAL at 08:29

## 2021-03-20 RX ADMIN — HYDROMORPHONE HYDROCHLORIDE 0.5 MG: 1 INJECTION, SOLUTION INTRAMUSCULAR; INTRAVENOUS; SUBCUTANEOUS at 19:47

## 2021-03-20 RX ADMIN — OXYCODONE HYDROCHLORIDE 15 MG: 5 TABLET ORAL at 18:13

## 2021-03-20 RX ADMIN — APIXABAN 2.5 MG: 2.5 TABLET, FILM COATED ORAL at 19:47

## 2021-03-20 RX ADMIN — HYDROMORPHONE HYDROCHLORIDE 0.5 MG: 1 INJECTION, SOLUTION INTRAMUSCULAR; INTRAVENOUS; SUBCUTANEOUS at 01:51

## 2021-03-20 RX ADMIN — APIXABAN 2.5 MG: 2.5 TABLET, FILM COATED ORAL at 08:30

## 2021-03-20 RX ADMIN — OXYCODONE HYDROCHLORIDE 15 MG: 5 TABLET ORAL at 08:29

## 2021-03-20 RX ADMIN — PANTOPRAZOLE SODIUM 40 MG: 40 TABLET, DELAYED RELEASE ORAL at 08:30

## 2021-03-20 RX ADMIN — OXYCODONE HYDROCHLORIDE 15 MG: 5 TABLET ORAL at 22:15

## 2021-03-20 RX ADMIN — ATORVASTATIN CALCIUM 40 MG: 40 TABLET, FILM COATED ORAL at 08:30

## 2021-03-20 RX ADMIN — BUPROPION HYDROCHLORIDE 100 MG: 100 TABLET ORAL at 08:29

## 2021-03-20 RX ADMIN — LIDOCAINE 1 PATCH: 246 PATCH TOPICAL at 08:30

## 2021-03-20 RX ADMIN — CYCLOBENZAPRINE HYDROCHLORIDE 5 MG: 5 TABLET, FILM COATED ORAL at 22:15

## 2021-03-20 NOTE — NURSING NOTE
Pt c/o severe L hip pain. Pt not yet due for oxycodone. Dr. Dharmesh knight. One time dose IVP dilaudid ordered and given. Pt reports adequate pain relief after dilaudid.

## 2021-03-20 NOTE — ASSESSMENT & PLAN NOTE
-s/p L MIGUEL by Dr. Rick on 3-2-21  -admitted with erythema, swelling and warmth surrounding incision site  -CT L hip shows: Large rim-enhancing fluid collection within the anterolateral thigh/hip musculature, lateral thigh subcutaneous soft tissues, and appearing to extend to the anterior aspect of the femoral neck component of the total hip arthroplasty,  -Appreciate Ortho recommendations, s/p aspiration 3/19/21; aspirated fluid result -> not consistent w an infection but more consistent w hematoma. No indication for abx per Dr. Hickman.  -Cx NGTD  -H&H stable, continue Eliquis  -Pain control  -Bowel regimen  -Encouraged incentive spirometry  -Close outpatient follow-up

## 2021-03-20 NOTE — PROGRESS NOTES
Orthopaedics Progress Note    [Interval events]  No acute events overnight. Patient seen at bedside by Dr. Hickman yesterday > plan for compressive hip spica ACE wrap and follow up in clinic.    [S]   No acute distress. Pain well tolerated. Mobilizing well.    [O]   Vitals:    03/20/21 0700   BP: 135/80   Pulse: 83   Resp: 18   Temp: 36.7 °C (98.1 °F)   SpO2: 99%       Physical Exam:  SILT  +DP/PT  +EHL/FHL/PF/DF  Dressing clean/dry/intact > hip spica ACE wrap applied  Quadriceps firing      [A/P]    -Dispo planning > home when saf efrom orthopaedic perspective  -Dr. Hickman will follow up in his office on Thursday  -Weight bearing as tolerated   -Analgesia    James Loredo MD  Orthopaedic Surgery

## 2021-03-20 NOTE — PROGRESS NOTES
Hospital Medicine Service -  Daily Progress Note       SUBJECTIVE   Interval History: Pt seen and examined. Denies any Cp or SOB, no fevers/chills. States that his left hip feels a bit sore post aspiration.     OBJECTIVE      Vital signs in last 24 hours:  Temp:  [36.4 °C (97.5 °F)-36.9 °C (98.4 °F)] 36.7 °C (98.1 °F)  Heart Rate:  [74-83] 83  Resp:  [17-18] 18  BP: (119-141)/(68-82) 135/80  No intake or output data in the 24 hours ending 03/20/21 1342    PHYSICAL EXAMINATION      Physical Exam  Constitutional:       Appearance: Normal appearance.   HENT:      Head: Normocephalic and atraumatic.   Eyes:      Conjunctiva/sclera: Conjunctivae normal.   Neck:      Musculoskeletal: Neck supple.   Cardiovascular:      Rate and Rhythm: Normal rate and regular rhythm.      Heart sounds: Normal heart sounds.   Pulmonary:      Effort: Pulmonary effort is normal.      Breath sounds: Normal breath sounds.   Abdominal:      General: Bowel sounds are normal. There is no distension.      Palpations: Abdomen is soft.      Tenderness: There is no abdominal tenderness.   Musculoskeletal:      Comments: Left hip dressing c/d/i   Skin:     General: Skin is warm and dry.   Neurological:      Mental Status: He is alert and oriented to person, place, and time.   Psychiatric:         Mood and Affect: Mood normal.         Behavior: Behavior normal.            LINES, CATHETERS, DRAINS, AIRWAYS, AND WOUNDS   Lines, Drains, and Airways:  Wounds (agree with documentation and present on admission):  Peripheral IV 03/18/21 Left Arm (Active)   Number of days: 2       Surgical Incision Hip Left (Active)   Number of days: 18         Comments:      LABS / IMAGING / TELE      Labs  Reviewed  Results from last 7 days   Lab Units 03/20/21  0439 03/19/21  0306 03/18/21  1532   WBC K/uL 7.78 7.17 7.64   HEMOGLOBIN g/dL 12.0* 11.8* 13.6*   HEMATOCRIT % 37.3* 36.8* 41.8   PLATELETS K/uL 375* 409* 438*     Results from last 7 days   Lab Units  03/20/21  0439 03/19/21  0306 03/18/21  1532   SODIUM mEQ/L 140 139 139   POTASSIUM mEQ/L 4.1 4.2 3.7   CHLORIDE mEQ/L 104 105 103   CO2 mEQ/L 28 26 27   BUN mg/dL 17 13 12   CREATININE mg/dL 0.9 0.8 0.9   CALCIUM mg/dL 8.9 8.7* 9.1   ALBUMIN g/dL 3.1*  --   --    BILIRUBIN TOTAL mg/dL 0.3  --   --    ALK PHOS IU/L 99  --   --    ALT IU/L 21  --   --    AST IU/L 19  --   --    GLUCOSE mg/dL 95 79 113*       SARS-CoV-2 (COVID-19) (no units)   Date/Time Value   03/18/2021 1639 Negative       Imaging  I have independently reviewed the pertinent imaging from the last 24 hrs.    ECG/Telemetry  n/a    ASSESSMENT AND PLAN      Hyperlipidemia  Assessment & Plan  -continue statin    Asymptomatic HIV infection (CMS/[facility])  Assessment & Plan  -Patient reports that his viral load is currently undetectable  -Continue descovy and tivicay- patient reports he is able to bring his medications in from home    Bipolar 1 disorder (CMS/[facility])  Assessment & Plan  -Continue Wellbutrin, patient reports that he is no longer taking Seroquel    DVT (deep venous thrombosis) (CMS/[facility])  Assessment & Plan  -hx of DVT in 2015  -eliquis resumed    HARPER (obstructive sleep apnea)  Assessment & Plan  -no longer uses CPAP     * Postoperative surgical complication involving subcutaneous tissue associated with non-dermatologic procedure  Assessment & Plan  -s/p L MIGUEL by Dr. Rick on 3-2-21  -now with erythema, swelling and warmth surrounding incision site  -CT L hip shows: Large rim-enhancing fluid collection within the anterolateral thigh/hip musculature, lateral thigh subcutaneous soft tissues, and appearing to extend to the anterior aspect of the femoral neck component of the total hip arthroplasty,  -Appreciate Ortho recommendations, s/p aspiration 3/19/21   My Colleague Dr Jennifer Golden regarding pt's aspirated fluid result -> not consistent w an infection but more consistent w hematoma. No indication for abx per dr. Remy.  Follow  cultures  eliquis resumed  Follow H&H closely - hgb stable today  Optimize pain control  Bowel regimen  Encouraged incentive spirometry  Will follow closely         VTE Assessment: Padua VTE Score: 6  VTE Prophylaxis:  Current anticoagulant orders:              apixaban (ELIQUIS) tablet 2.5 mg  2 times daily        Code Status: Full Code      Estimated Discharge Date: 3/22/2021   Disposition Planning: home when medically stable     Manolo Paez MD  3/20/2021

## 2021-03-21 LAB
ERYTHROCYTE [DISTWIDTH] IN BLOOD BY AUTOMATED COUNT: 14.4 % (ref 11.6–14.4)
HCT VFR BLDCO AUTO: 37.1 % (ref 40.1–51)
HGB BLD-MCNC: 11.6 G/DL (ref 13.7–17.5)
MCH RBC QN AUTO: 29.7 PG (ref 28–33.2)
MCHC RBC AUTO-ENTMCNC: 31.3 G/DL (ref 32.2–36.5)
MCV RBC AUTO: 94.9 FL (ref 83–98)
PDW BLD AUTO: 9.1 FL (ref 9.4–12.4)
PLATELET # BLD AUTO: 373 K/UL (ref 150–350)
RBC # BLD AUTO: 3.91 M/UL (ref 4.5–5.8)
WBC # BLD AUTO: 6.15 K/UL (ref 3.8–10.5)

## 2021-03-21 PROCEDURE — 63700000 HC SELF-ADMINISTRABLE DRUG: Performed by: HOSPITALIST

## 2021-03-21 PROCEDURE — 85027 COMPLETE CBC AUTOMATED: CPT | Performed by: HOSPITALIST

## 2021-03-21 PROCEDURE — 12000000 HC ROOM AND CARE MED/SURG

## 2021-03-21 PROCEDURE — 63600000 HC DRUGS/DETAIL CODE: Performed by: HOSPITALIST

## 2021-03-21 PROCEDURE — 36415 COLL VENOUS BLD VENIPUNCTURE: CPT | Performed by: HOSPITALIST

## 2021-03-21 PROCEDURE — 99232 SBSQ HOSP IP/OBS MODERATE 35: CPT | Performed by: HOSPITALIST

## 2021-03-21 RX ADMIN — OXYCODONE HYDROCHLORIDE 15 MG: 5 TABLET ORAL at 11:59

## 2021-03-21 RX ADMIN — HYDROMORPHONE HYDROCHLORIDE 0.5 MG: 1 INJECTION, SOLUTION INTRAMUSCULAR; INTRAVENOUS; SUBCUTANEOUS at 08:25

## 2021-03-21 RX ADMIN — LAMOTRIGINE 200 MG: 200 TABLET ORAL at 08:25

## 2021-03-21 RX ADMIN — OXYCODONE HYDROCHLORIDE 15 MG: 5 TABLET ORAL at 20:08

## 2021-03-21 RX ADMIN — BUPROPION HYDROCHLORIDE 100 MG: 100 TABLET ORAL at 08:25

## 2021-03-21 RX ADMIN — OXYCODONE HYDROCHLORIDE 15 MG: 5 TABLET ORAL at 16:22

## 2021-03-21 RX ADMIN — PANTOPRAZOLE SODIUM 40 MG: 40 TABLET, DELAYED RELEASE ORAL at 08:25

## 2021-03-21 RX ADMIN — APIXABAN 2.5 MG: 2.5 TABLET, FILM COATED ORAL at 20:05

## 2021-03-21 RX ADMIN — ATORVASTATIN CALCIUM 40 MG: 40 TABLET, FILM COATED ORAL at 08:25

## 2021-03-21 RX ADMIN — OXYCODONE HYDROCHLORIDE 15 MG: 5 TABLET ORAL at 01:58

## 2021-03-21 RX ADMIN — HYDROMORPHONE HYDROCHLORIDE 0.5 MG: 1 INJECTION, SOLUTION INTRAMUSCULAR; INTRAVENOUS; SUBCUTANEOUS at 03:16

## 2021-03-21 RX ADMIN — LIDOCAINE 1 PATCH: 246 PATCH TOPICAL at 08:25

## 2021-03-21 RX ADMIN — APIXABAN 2.5 MG: 2.5 TABLET, FILM COATED ORAL at 08:25

## 2021-03-21 RX ADMIN — ACETAMINOPHEN 650 MG: 325 TABLET, FILM COATED ORAL at 16:21

## 2021-03-21 RX ADMIN — ONDANSETRON 4 MG: 2 INJECTION INTRAMUSCULAR; INTRAVENOUS at 10:22

## 2021-03-21 RX ADMIN — CYCLOBENZAPRINE HYDROCHLORIDE 5 MG: 5 TABLET, FILM COATED ORAL at 21:31

## 2021-03-21 RX ADMIN — OXYCODONE HYDROCHLORIDE 15 MG: 5 TABLET ORAL at 06:31

## 2021-03-21 RX ADMIN — ONDANSETRON 4 MG: 2 INJECTION INTRAMUSCULAR; INTRAVENOUS at 16:32

## 2021-03-21 NOTE — NURSING NOTE
Pt's left hip has increasing edema, non tender or hard to touch. Area feels filled with fluid. Ortho made aware. Will come up to see pt.

## 2021-03-21 NOTE — PROGRESS NOTES
Hospital Medicine Service -  Daily Progress Note       SUBJECTIVE   Interval History: Pt seen and examined. Denies any Cp or SOB, no fevers/chills.  Patient reported increased hip pain overnight and also some left groin pain.  Discussed with orthopedic resident about patient's increased hip pain/groin pain and they had indicated he was evaluated this morning and were not concerned, no further work-up inpatient and they had recommended he follow-up with them outpatient, cleared pt for discharge from an orthopedic standpoint.     OBJECTIVE      Vital signs in last 24 hours:  Temp:  [36.3 °C (97.3 °F)-36.8 °C (98.2 °F)] 36.8 °C (98.2 °F)  Heart Rate:  [62-82] 82  Resp:  [18] 18  BP: (123-133)/(75-80) 123/75  No intake or output data in the 24 hours ending 03/21/21 1021    PHYSICAL EXAMINATION      Physical Exam   Constitutional:       Appearance: Normal appearance.   HENT:      Head: Normocephalic and atraumatic.   Eyes:      Conjunctiva/sclera: Conjunctivae normal.   Neck:      Musculoskeletal: Neck supple.   Cardiovascular:      Rate and Rhythm: Normal rate and regular rhythm.      Heart sounds: Normal heart sounds.   Pulmonary:      Effort: Pulmonary effort is normal.      Breath sounds: Normal breath sounds.   Abdominal:      General: Bowel sounds are normal. There is no distension.      Palpations: Abdomen is soft.      Tenderness: There is no abdominal tenderness.   Musculoskeletal:      Comments: Left hip dressing  Skin:     General: Skin is warm and dry.   Neurological:      Mental Status: He is alert and oriented to person, place, and time.   Psychiatric:         Mood and Affect: Mood normal.         Behavior: Behavior normal.        LINES, CATHETERS, DRAINS, AIRWAYS, AND WOUNDS   Lines, Drains, and Airways:  Wounds (agree with documentation and present on admission):  Peripheral IV 03/18/21 Left Arm (Active)   Number of days: 3       Surgical Incision Hip Left (Active)   Number of days: 19          Comments:      LABS / IMAGING / TELE      Labs  Reviewed  Results from last 7 days   Lab Units 03/21/21  0524 03/20/21  0439 03/19/21  0306   WBC K/uL 6.15 7.78 7.17   HEMOGLOBIN g/dL 11.6* 12.0* 11.8*   HEMATOCRIT % 37.1* 37.3* 36.8*   PLATELETS K/uL 373* 375* 409*     Results from last 7 days   Lab Units 03/20/21  0439 03/19/21  0306 03/18/21  1532   SODIUM mEQ/L 140 139 139   POTASSIUM mEQ/L 4.1 4.2 3.7   CHLORIDE mEQ/L 104 105 103   CO2 mEQ/L 28 26 27   BUN mg/dL 17 13 12   CREATININE mg/dL 0.9 0.8 0.9   GLUCOSE mg/dL 95 79 113*   CALCIUM mg/dL 8.9 8.7* 9.1       SARS-CoV-2 (COVID-19) (no units)   Date/Time Value   03/18/2021 1639 Negative       Imaging  n/a    ECG/Telemetry  n/a    ASSESSMENT AND PLAN      Hyperlipidemia  Assessment & Plan  -continue statin    Asymptomatic HIV infection (CMS/[facility])  Assessment & Plan  -Patient reports that his viral load is currently undetectable  -Continue descovy and tivicay- patient reports he is able to bring his medications in from home    Bipolar 1 disorder (CMS/[facility])  Assessment & Plan  -Continue Wellbutrin, patient reports that he is no longer taking Seroquel    DVT (deep venous thrombosis) (CMS/[facility])  Assessment & Plan  -hx of DVT in 2015  -eliquis resumed    HARPER (obstructive sleep apnea)  Assessment & Plan  -no longer uses CPAP     * Postoperative surgical complication involving subcutaneous tissue associated with non-dermatologic procedure  Assessment & Plan  -s/p L MIGUEL by Dr. Rick on 3-2-21  -now with erythema, swelling and warmth surrounding incision site  -CT L hip shows: Large rim-enhancing fluid collection within the anterolateral thigh/hip musculature, lateral thigh subcutaneous soft tissues, and appearing to extend to the anterior aspect of the femoral neck component of the total hip arthroplasty,  -Appreciate Ortho recommendations, s/p aspiration 3/19/21   My Colleague Dr Jennifer Golden regarding pt's aspirated fluid result -> not consistent w  an infection but more consistent w hematoma. No indication for abx per dr. Remy.  Follow cultures - so far negative  eliquis resumed  Follow H&H closely - hgb appears to be stable  Optimize pain control  Bowel regimen  Encouraged incentive spirometry  Will follow closely         VTE Assessment: Padua VTE Score: 6  VTE Prophylaxis:  Current anticoagulant orders:              apixaban (ELIQUIS) tablet 2.5 mg  2 times daily        Code Status: Full Code      Estimated Discharge Date: 3/22/2021   Disposition Planning: home     Manolo Paez MD  3/21/2021

## 2021-03-22 VITALS
HEART RATE: 83 BPM | RESPIRATION RATE: 16 BRPM | TEMPERATURE: 97.1 F | SYSTOLIC BLOOD PRESSURE: 117 MMHG | WEIGHT: 207.1 LBS | DIASTOLIC BLOOD PRESSURE: 63 MMHG | OXYGEN SATURATION: 98 % | BODY MASS INDEX: 29.65 KG/M2 | HEIGHT: 70 IN

## 2021-03-22 LAB
ERYTHROCYTE [DISTWIDTH] IN BLOOD BY AUTOMATED COUNT: 14.3 % (ref 11.6–14.4)
HCT VFR BLDCO AUTO: 40.2 % (ref 40.1–51)
HGB BLD-MCNC: 12.5 G/DL (ref 13.7–17.5)
MCH RBC QN AUTO: 30 PG (ref 28–33.2)
MCHC RBC AUTO-ENTMCNC: 31.1 G/DL (ref 32.2–36.5)
MCV RBC AUTO: 96.4 FL (ref 83–98)
PDW BLD AUTO: 9 FL (ref 9.4–12.4)
PLATELET # BLD AUTO: 392 K/UL (ref 150–350)
RBC # BLD AUTO: 4.17 M/UL (ref 4.5–5.8)
WBC # BLD AUTO: 7.31 K/UL (ref 3.8–10.5)

## 2021-03-22 PROCEDURE — 63600000 HC DRUGS/DETAIL CODE: Performed by: HOSPITALIST

## 2021-03-22 PROCEDURE — 63700000 HC SELF-ADMINISTRABLE DRUG: Performed by: HOSPITALIST

## 2021-03-22 PROCEDURE — 36415 COLL VENOUS BLD VENIPUNCTURE: CPT | Performed by: HOSPITALIST

## 2021-03-22 PROCEDURE — 85027 COMPLETE CBC AUTOMATED: CPT | Performed by: HOSPITALIST

## 2021-03-22 PROCEDURE — 99238 HOSP IP/OBS DSCHRG MGMT 30/<: CPT | Performed by: HOSPITALIST

## 2021-03-22 RX ORDER — ACETAMINOPHEN 325 MG/1
650 TABLET ORAL EVERY 6 HOURS
Qty: 56 TABLET | Refills: 0 | Status: SHIPPED | OUTPATIENT
Start: 2021-03-22 | End: 2021-03-29

## 2021-03-22 RX ORDER — OXYCODONE HYDROCHLORIDE 15 MG/1
15 TABLET ORAL EVERY 6 HOURS PRN
Qty: 20 TABLET | Refills: 0 | Status: SHIPPED | OUTPATIENT
Start: 2021-03-22 | End: 2021-03-27

## 2021-03-22 RX ORDER — AMOXICILLIN 250 MG
1 CAPSULE ORAL 2 TIMES DAILY
Qty: 60 TABLET | Refills: 0 | Status: SHIPPED | OUTPATIENT
Start: 2021-03-22 | End: 2021-04-21

## 2021-03-22 RX ORDER — LIDOCAINE 560 MG/1
1 PATCH PERCUTANEOUS; TOPICAL; TRANSDERMAL DAILY
Qty: 30 PATCH | Refills: 0 | Status: SHIPPED | OUTPATIENT
Start: 2021-03-23 | End: 2021-04-22

## 2021-03-22 RX ADMIN — PANTOPRAZOLE SODIUM 40 MG: 40 TABLET, DELAYED RELEASE ORAL at 08:51

## 2021-03-22 RX ADMIN — HYDROMORPHONE HYDROCHLORIDE 0.5 MG: 1 INJECTION, SOLUTION INTRAMUSCULAR; INTRAVENOUS; SUBCUTANEOUS at 01:26

## 2021-03-22 RX ADMIN — ACETAMINOPHEN 650 MG: 325 TABLET, FILM COATED ORAL at 00:23

## 2021-03-22 RX ADMIN — BUPROPION HYDROCHLORIDE 100 MG: 100 TABLET ORAL at 08:51

## 2021-03-22 RX ADMIN — LAMOTRIGINE 200 MG: 200 TABLET ORAL at 08:50

## 2021-03-22 RX ADMIN — ACETAMINOPHEN 650 MG: 325 TABLET, FILM COATED ORAL at 08:50

## 2021-03-22 RX ADMIN — APIXABAN 2.5 MG: 2.5 TABLET, FILM COATED ORAL at 08:50

## 2021-03-22 RX ADMIN — OXYCODONE HYDROCHLORIDE 15 MG: 5 TABLET ORAL at 08:49

## 2021-03-22 RX ADMIN — OXYCODONE HYDROCHLORIDE 15 MG: 5 TABLET ORAL at 04:32

## 2021-03-22 RX ADMIN — LIDOCAINE 1 PATCH: 246 PATCH TOPICAL at 08:51

## 2021-03-22 RX ADMIN — ATORVASTATIN CALCIUM 40 MG: 40 TABLET, FILM COATED ORAL at 08:50

## 2021-03-22 RX ADMIN — OXYCODONE HYDROCHLORIDE 15 MG: 5 TABLET ORAL at 00:20

## 2021-03-22 NOTE — PATIENT CARE CONFERENCE
Care Progression Rounds Note  Date: 3/22/2021  Time: 10:03 AM     Patient Name: Sam Fitzgerald Chevalier     Medical Record Number: [id number]   YOB: 1975  Sex: Male      Room/Bed: 5428    Admitting Diagnosis: Postoperative surgical complication involving subcutaneous tissue associated with non-dermatologic procedure, unspecified complication [L76.82]   Admit Date/Time: 3/18/2021  2:54 PM    Primary Diagnosis: Postoperative surgical complication involving subcutaneous tissue associated with non-dermatologic procedure  Principal Problem: Postoperative surgical complication involving subcutaneous tissue associated with non-dermatologic procedure    GMLOS: pending  Anticipated Discharge Date: 3/22/2021    AM-PAC  Mobility Score:      Discharge Planning:  Anticipated Discharge Disposition: home without services    Barriers to Discharge:  Barriers to Discharge: None    Participants:  advanced practice provider, , nursing, social work/services, physical therapy

## 2021-03-22 NOTE — DISCHARGE SUMMARY
[facility] Medicine Service -  Inpatient Discharge Summary        BRIEF OVERVIEW   Admitting Provider: Carlita Winters DO  Attending Provider: Drake Monterroso MD Attending phys phone: [phone]    PCP: Jenae Beck [phone]    Admission Date: 3/18/2021  Discharge Date: 3/22/2021     DISCHARGE DIAGNOSES      Primary Discharge Diagnosis  Postoperative surgical complication involving subcutaneous tissue associated with non-dermatologic procedure    Secondary Discharge Diagnoses  Active Hospital Problems    Diagnosis Date Noted   • Postoperative surgical complication involving subcutaneous tissue associated with non-dermatologic procedure 03/18/2021     Priority: High   • Hematoma of left hip 03/19/2021   • Diarrhea 03/18/2021   • Hyperlipidemia 03/02/2021   • DVT (deep venous thrombosis) (CMS/HCC) 02/25/2021   • Bipolar 1 disorder (CMS/Formerly [facility]) 02/25/2021   • Chronic pancreatitis (CMS/Formerly [facility]) 02/25/2021   • Asymptomatic HIV infection (CMS/HCC) 02/25/2021      Resolved Hospital Problems   No resolved problems to display.       Problem List on Day of Discharge  * Postoperative surgical complication involving subcutaneous tissue associated with non-dermatologic procedure  Assessment & Plan  -s/p L MIGUEL by Dr. Rick on 3-2-21  -admitted with erythema, swelling and warmth surrounding incision site  -CT L hip shows: Large rim-enhancing fluid collection within the anterolateral thigh/hip musculature, lateral thigh subcutaneous soft tissues, and appearing to extend to the anterior aspect of the femoral neck component of the total hip arthroplasty,  -Appreciate Ortho recommendations, s/p aspiration 3/19/21; aspirated fluid result -> not consistent w an infection but more consistent w hematoma. No indication for abx per Dr. Hickman.  -Cx NGTD  -H&H stable, continue Eliquis  -Pain control  -Bowel regimen  -Encouraged incentive spirometry  -Close outpatient follow-up    Hyperlipidemia  Assessment &  Plan  -continue statin    Asymptomatic HIV infection (CMS/Formerly [facility])  Assessment & Plan  -Patient reports that his viral load is currently undetectable  -Continue descovy and tivicay- patient reports he is able to bring his medications in from home    Bipolar 1 disorder (CMS/Formerly [facility])  Assessment & Plan  -Continue Wellbutrin, patient reports that he is no longer taking Seroquel    DVT (deep venous thrombosis) (CMS/Formerly [facility])  Assessment & Plan  -hx of DVT in 2015  -continue eliquis     HARPER (obstructive sleep apnea)  Assessment & Plan  -no longer uses CPAP       SUMMARY OF HOSPITALIZATION      Presenting Problem/History of Present Illness  Postoperative surgical complication involving subcutaneous tissue associated with non-dermatologic procedure, unspecified complication [L76.82]    This is a 45 y.o. year-old male admitted on 3/18/2021 with Postoperative surgical complication involving subcutaneous tissue associated with non-dermatologic procedure, unspecified complication [L76.82].    Hospital Course  45 year old male w a PMH of HIV, chronic pancreatitis, anemia, prior DVT (on Eliquis), s/p recent Left MIGUEL (on 3-2-21 by Dr. Rick) that was admitted for a fluid collection in L anterolateral thigh/hip musculature on 3/18/21. Patient was seen by Ortho and ID and recommended for aspiration of fluid collection for culture and analysis. Patient underwent US guided L hip aspiration w/ IR on 3/19/21. Initially there was concern for infection but per Orthopedics fluid analysis c/w hematoma. Orthopedics did not recommend any antibiotics and cultures NGTD. He has been afebrile without any signs of infection in the hospital. Eliquis which was initially held but has been restarted. Hemoglobin remains stable. Orthopedics have cleared patient for discharge as there are no further acute surgical procedures planned. He will have follow-up arranged with Orthopedics on Thursday by Dr. Hickman's nurse. Patient should keep the ACE wrap on until  re-evaluation.   Patient medically cleared and stable for discharge. He will need continued close outpatient Orthopedic follow-up. He should also follow-up with his PCP.     Exam on Day of Discharge  Physical Exam  Vitals signs and nursing note reviewed.   Constitutional:       General: He is not in acute distress.     Appearance: Normal appearance. He is not toxic-appearing.   HENT:      Head: Normocephalic and atraumatic.   Eyes:      Conjunctiva/sclera: Conjunctivae normal.   Neck:      Musculoskeletal: Neck supple.   Cardiovascular:      Rate and Rhythm: Normal rate and regular rhythm.      Heart sounds: Normal heart sounds.   Pulmonary:      Effort: Pulmonary effort is normal. No respiratory distress.      Breath sounds: Normal breath sounds.   Abdominal:      General: Bowel sounds are normal. There is no distension.      Palpations: Abdomen is soft.      Tenderness: There is no abdominal tenderness.   Musculoskeletal: Normal range of motion.   Skin:     General: Skin is warm and dry.   Neurological:      General: No focal deficit present.      Mental Status: He is oriented to person, place, and time.   Psychiatric:         Mood and Affect: Mood normal.         Behavior: Behavior normal.         Consults During Admission  IP CONSULT TO INFECTIOUS DISEASE    DISCHARGE MEDICATIONS     PENDING ORDERS (720h ago, onward)         Ordered     QUEtiapine (SEROquel) tablet 100 mg  2 times daily,   Discontinued:  --,   Status:  Canceled      Signed and Held               Medication List      START taking these medications    acetaminophen 325 mg tablet  Commonly known as: TYLENOL  Take 2 tablets (650 mg total) by mouth every 6 (six) hours for 7 days.  Dose: 650 mg     lidocaine 4 % adhesive patch,medicated topical patch  Commonly known as: ASPERCREME  Start taking on: March 23, 2021  Apply 1 patch topically daily.  Dose: 1 patch     sennosides-docusate sodium 8.6-50 mg  Commonly known as: SENNA WITH DOCUSATE SODIUM  Take  1 tablet by mouth 2 (two) times a day. Hold for diarrhea, take this while taking narcotic pain medication  Dose: 1 tablet        CHANGE how you take these medications    oxyCODONE 15 mg immediate release tablet  Commonly known as: ROXICODONE  Take 1 tablet (15 mg total) by mouth every 6 (six) hours as needed for severe pain for up to 5 days.  Dose: 15 mg  What changed:   · medication strength  · how much to take  · how to take this  · when to take this  · reasons to take this  · additional instructions        CONTINUE taking these medications    apixaban 2.5 mg tablet  Commonly known as: ELIQUIS  Take 1 tablet (2.5 mg total) by mouth 2 (two) times a day. (RESUME ON 3/10/21)  Dose: 2.5 mg     atorvastatin 40 mg tablet  Commonly known as: LIPITOR  Take 40 mg by mouth every morning.  Dose: 40 mg     * buPROPion 100 mg tablet  Commonly known as: WELLBUTRIN  Take 100 mg by mouth daily. Patient takes 100 mg in the morning,  Dose: 100 mg     * buPROPion 75 mg tablet  Commonly known as: WELLBUTRIN  Take 75 mg by mouth daily as needed. Patient takes bupropion 100 mg daily in the morning, and 75 mg at lunch prn (two separate entries)  Dose: 75 mg     clonazePAM 0.5 mg tablet  Commonly known as: klonoPIN  Take 0.5 mg by mouth 2 (two) times a week (Sun, Wed). Pt takes as needed  Dose: 0.5 mg     cyclobenzaprine 5 mg tablet  Commonly known as: FLEXERIL  Take 1 tablet (5 mg total) by mouth nightly. TAKE AS DIRECTED BY DR CALVO  Dose: 5 mg     DESCOVY 200-25 mg tablet tablet  Take 1 tablet by mouth every morning.  Dose: 1 tablet  Generic drug: emtricitabine-tenofovir alafenamide     lamoTRIgine 200 mg tablet  Commonly known as: LaMICtal  Take 200 mg by mouth daily. 1 tablet daily  Dose: 200 mg     pantoprazole 40 mg EC tablet  Commonly known as: PROTONIX  Take 40 mg by mouth every morning.  Dose: 40 mg     TIVICAY 50 mg tablet  Take 50 mg by mouth every morning.  Dose: 50 mg  Generic drug: dolutegravir         * This list has 2  medication(s) that are the same as other medications prescribed for you. Read the directions carefully, and ask your doctor or other care provider to review them with you.                Instructions for after discharge     Call provider for:  difficulty breathing, headache or visual disturbances      Call provider for:  extreme fatigue      Call provider for:  persistent dizziness or light-headedness      Call provider for:  persistent nausea or vomiting      Call provider for:  rash      Call provider for:  redness, tenderness, or signs of infection (pain, swelling, redness, odor or green/yellow discharge around incision site)      Call provider for:  severe uncontrolled pain      Call provider for:  temperature >100.4      Discharge diet      Diet Type / Texture: Regular    Fluid Consistency: Thin Liquids    Follow-up with primary physician (PCP)      Please follow-up with your PCP within 1 week    Follow-up with provider      Please follow-up with Orthopedics on Thursday as arranged    Adriana Hickman MD   [phone] [address]  [address]  [place] [address]       Post-Discharge Activity: Normal activity as tolerated.      Normal activity as tolerated.             PROCEDURES / LABS / IMAGING      Operative Procedures  None    Other Procedures  3/19/21: US guided L hip aspiration w/ 370 cc cloudy serosanginous fluid removed    Pertinent Labs  Results from last 7 days   Lab Units 03/20/21  0439 03/19/21  0306 03/18/21  1532   SODIUM mEQ/L 140 139 139   POTASSIUM mEQ/L 4.1 4.2 3.7   CHLORIDE mEQ/L 104 105 103   CO2 mEQ/L 28 26 27   BUN mg/dL 17 13 12   CREATININE mg/dL 0.9 0.8 0.9   CALCIUM mg/dL 8.9 8.7* 9.1   ALBUMIN g/dL 3.1*  --   --    BILIRUBIN TOTAL mg/dL 0.3  --   --    ALK PHOS IU/L 99  --   --    ALT IU/L 21  --   --    AST IU/L 19  --   --    GLUCOSE mg/dL 95 79 113*     Results from last 7 days   Lab Units 03/22/21  0626 03/21/21  0524 03/20/21  0439   WBC K/uL 7.31 6.15 7.78    HEMOGLOBIN g/dL 12.5* 11.6* 12.0*   HEMATOCRIT % 40.2 37.1* 37.3*   PLATELETS K/uL 392* 373* 375*     Microbiology Results     Procedure Component Value Units Date/Time    Body Fluid Culture/Smear Hip, Left [[id number]] Collected: 03/19/21 1414    Specimen: Synovial Fluid from Hip, Left Updated: 03/22/21 0707     Culture No growth at 72 hours     Gram Stain Result No WBC Seen      No organisms seen    SARS-CoV-2 (COVID-19), PCR Nasopharynx [[id number]]  (Normal) Collected: 03/18/21 1639    Specimen: Nasopharyngeal Swab from Nasopharynx Updated: 03/18/21 1754    Narrative:      The following orders were created for panel order SARS-CoV-2 (COVID-19), PCR Nasopharynx.  Procedure                               Abnormality         Status                     ---------                               -----------         ------                     SARS-CoV-2 (COVID-19), P...[[id number]]  Normal              Final result                 Please view results for these tests on the individual orders.    SARS-CoV-2 (COVID-19), PCR Nasopharynx [[id number]]  (Normal) Collected: 03/18/21 1639    Specimen: Nasopharyngeal Swab from Nasopharynx Updated: 03/18/21 1754     SARS-CoV-2 (COVID-19) Negative    Blood Culture Blood, Venous [[id number]] Collected: 03/18/21 1532    Specimen: Blood, Venous Updated: 03/21/21 1901     Culture No growth at 72 hours    Blood Culture Blood, Venous [[id number]] Collected: 03/18/21 1532    Specimen: Blood, Venous Updated: 03/21/21 1901     Culture No growth at 72 hours    SARS-CoV-2 (COVID-19), PCR Nasopharynx [[id number]]  (Normal) Collected: 03/03/21 0516    Specimen: Nasopharyngeal Swab from Nasopharynx Updated: 03/03/21 0800    Narrative:      The following orders were created for panel order SARS-CoV-2 (COVID-19), PCR Nasopharynx.  Procedure                               Abnormality         Status                     ---------                               -----------         ------                      SARS-CoV-2 (COVID-19), P...[[id number]]  Normal              Final result                 Please view results for these tests on the individual orders.    SARS-CoV-2 (COVID-19), PCR Nasopharynx [[id number]]  (Normal) Collected: 03/03/21 0516    Specimen: Nasopharyngeal Swab from Nasopharynx Updated: 03/03/21 0800     SARS-CoV-2 (COVID-19) Negative        Above labs have been personally reviewed.     SARS-CoV-2 (COVID-19) (no units)   Date/Time Value   03/18/2021 1639 Negative       Pertinent Imaging  Ct Hip Left With And Without Iv Contrast    Result Date: 3/18/2021  IMPRESSION: Large rim-enhancing fluid collection within the anterolateral thigh/hip musculature, lateral thigh subcutaneous soft tissues, and appearing to extend to the anterior aspect of the femoral neck component of the total hip arthroplasty, with evaluation limited due to streak artifact. The presence of rim enhancement is suspicious for possible infected fluid collection, and clinical correlation is recommended, including sampling of the fluid. There are also osseous changes along the anterior proximal femur along the vertical femoral component suspicious for associated osteomyelitis. See comment.     Ct Chest Pulmonary Embolism With Iv Contrast    Result Date: 3/18/2021  IMPRESSION: 1.  No evidence of acute pulmonary embolism to the level of the proximal segmental branches. 2.  Clear lungs.     Us Venous Leg, Ll Extremity    Result Date: 3/18/2021  IMPRESSION: No evidence for left-sided femoral-popliteal deep venous thrombosis. I certify that I have personally reviewed this study and agree with this report. Leslie Butt MD    X-ray Hip With Or Without Pelvis 1 Vw Left    Result Date: 3/2/2021  IMPRESSION: Status post left hip arthroplasty.    X-ray Hip With Or Without Pelvis 2-3 Vw Left    Result Date: 3/18/2021  IMPRESSION: Total left hip arthroplasty without evidence of hardware complication, dislocation or fracture. Lateral soft tissue  swelling.     Ultrasound Guided Needle Placement    Result Date: 3/19/2021  IMPRESSION: Percutaneous aspiration of the lateral left hip collection, yielding 370 mL of cloudy serosanguineous fluid. No drainage catheter was left in place. Plan: Further management per orthopedics. ______________________________________________________________________ PROCEDURE SUMMARY: - Aspiration of the left hip collection under ultrasound guidance - Additional procedure(s): None PROCEDURE DETAILS: Pre-procedure Consent: Informed consent for the procedure including risks, benefits and alternatives was obtained and time-out was performed prior to the procedure. Preparation: The site was prepared and draped using maximal sterile barrier technique including cutaneous antisepsis. Anesthesia/sedation Level of anesthesia/sedation: No sedation Anesthesia/sedation administered by: Not applicable Total intra-service sedation time (minutes): None Medications: None Fluid collection aspiration The patient was positioned supine. Initial imaging was performed. Local anesthesia was administered. The fluid collection was accessed using an access needle. Position within the fluid collection was confirmed, and fluid aspiration was performed. All instruments were then removed. - Initial imaging findings: Large, slightly complex collection extending to just below the skin surface at the level of the incision. - Aspiration needle/catheter: 5-L[place] Yueh needle/catheter - Post-aspiration imaging findings: Complete resolution of the fluid collection Contrast Contrast agent: None Contrast volume (mL): 0 Radiation Dose CT dose length product (mGy-cm): N/A Fluoroscopy time (minutes): N/A Reference air kerma (mGy): N/A Kerma area product (uGy-m2): N/A Additional Details Additional description of procedure: None Equipment details: None Specimens removed: Aspirated fluid was sent for analysis. Estimated blood loss (mL): Less than 10 Standardized report:  [email or web address]_[email or web address]       OUTPATIENT  FOLLOW-UP / REFERRALS / PENDING TESTS        Outpatient Follow-Up Appointments  Encounter Information     You do not currently have any appointments scheduled.          Referrals  No orders of the defined types were placed in this encounter.    Important Issues to Address in Follow-Up  DC to Home  Outpatient PCP and Orthopedic follow-up    DISCHARGE DISPOSITION      Disposition: Home     Code Status At Discharge: Full Code    Physician Order for Life-Sustaining Treatment Document Status      No documents found

## 2021-03-22 NOTE — PROGRESS NOTES
Orthopaedics Progress Note    [Interval events]  No acute events overnight. Patient seen at bedside compressive hip spica ACE wrap in situ with plan follow up in clinic.    [S]   No acute distress. Pain well tolerated. Mobilizing well.    [O]   Vitals:    03/22/21 0729   BP: 117/63   Pulse: 83   Resp: 16   Temp: 36.2 °C (97.1 °F)   SpO2: 98%       Physical Exam:  SILT  +DP/PT  +EHL/FHL/PF/DF  Dressing clean/dry/intact > hip spica ACE wrap applied  No increase in hematoma or change in wound size  Quadriceps firing      [A/P]  -Dispo planning > home today  -Dr. Hickman's nurse will call patient later today, and will then follow up in his office on Thursday  -Weight bearing as tolerated   -Analgesia    James Loredo MD  Orthopaedic Surgery

## 2021-03-23 LAB
BACTERIA BLD CULT: NORMAL
BACTERIA BLD CULT: NORMAL
GRAM STN SPEC: NORMAL
GRAM STN SPEC: NORMAL
MICROORGANISM SPEC CULT: NORMAL

## 2021-03-24 ENCOUNTER — TELEPHONE (OUTPATIENT)
Dept: CARDIOLOGY | Facility: CLINIC | Age: 46
End: 2021-03-24

## 2021-03-24 NOTE — TELEPHONE ENCOUNTER
Dr. Clinton Schmidt pt:    LMOM for pt.  We received pts. Release to Dr. Jenae Beck.    Pt had signed under the witness line and not the pt line.  They refused to send the records at this time.    LM asking pt to contact us - send a new release or will it be easier/faster for him to come to the our office or Dr. Beck's office.

## 2021-03-24 NOTE — TELEPHONE ENCOUNTER
Pt returned your call Susie.  He requested a call back at [phone] regarding his signing his record release.    Thanks.

## 2021-03-24 NOTE — TELEPHONE ENCOUNTER
Called pt.  He will call us if he wants to do anything more with the request of records.  I explained that the initial request was denied.

## 2023-09-07 ENCOUNTER — APPOINTMENT (RX ONLY)
Dept: URBAN - METROPOLITAN AREA CLINIC 374 | Facility: CLINIC | Age: 48
Setting detail: DERMATOLOGY
End: 2023-09-07

## 2023-09-07 DIAGNOSIS — D485 NEOPLASM OF UNCERTAIN BEHAVIOR OF SKIN: ICD-10-CM

## 2023-09-07 DIAGNOSIS — D18.0 HEMANGIOMA: ICD-10-CM

## 2023-09-07 DIAGNOSIS — L90.8 OTHER ATROPHIC DISORDERS OF SKIN: ICD-10-CM

## 2023-09-07 DIAGNOSIS — D22 MELANOCYTIC NEVI: ICD-10-CM

## 2023-09-07 PROBLEM — D48.5 NEOPLASM OF UNCERTAIN BEHAVIOR OF SKIN: Status: ACTIVE | Noted: 2023-09-07

## 2023-09-07 PROBLEM — D22.5 MELANOCYTIC NEVI OF TRUNK: Status: ACTIVE | Noted: 2023-09-07

## 2023-09-07 PROBLEM — D18.01 HEMANGIOMA OF SKIN AND SUBCUTANEOUS TISSUE: Status: ACTIVE | Noted: 2023-09-07

## 2023-09-07 PROCEDURE — ? COUNSELING

## 2023-09-07 PROCEDURE — ? PHOTO-DOCUMENTATION

## 2023-09-07 PROCEDURE — 11102 TANGNTL BX SKIN SINGLE LES: CPT

## 2023-09-07 PROCEDURE — ? DEFER

## 2023-09-07 PROCEDURE — 99203 OFFICE O/P NEW LOW 30 MIN: CPT | Mod: 25

## 2023-09-07 PROCEDURE — ? IN-HOUSE DISPENSING PHARMACY

## 2023-09-07 PROCEDURE — ? BIOPSY BY SHAVE METHOD

## 2023-09-07 PROCEDURE — ? PRESCRIPTION MEDICATION MANAGEMENT

## 2023-09-07 ASSESSMENT — LOCATION SIMPLE DESCRIPTION DERM
LOCATION SIMPLE: LEFT CHEEK
LOCATION SIMPLE: RIGHT SHOULDER
LOCATION SIMPLE: RIGHT UPPER BACK
LOCATION SIMPLE: ABDOMEN

## 2023-09-07 ASSESSMENT — LOCATION DETAILED DESCRIPTION DERM
LOCATION DETAILED: RIGHT POSTERIOR SHOULDER
LOCATION DETAILED: LEFT RIB CAGE
LOCATION DETAILED: RIGHT MEDIAL UPPER BACK
LOCATION DETAILED: LEFT CENTRAL MALAR CHEEK

## 2023-09-07 ASSESSMENT — LOCATION ZONE DERM
LOCATION ZONE: TRUNK
LOCATION ZONE: ARM
LOCATION ZONE: FACE

## 2023-09-07 NOTE — PROCEDURE: IN-HOUSE DISPENSING PHARMACY
Product 50 Amount/Unit (Numbers Only): 0
Product 18 Refills: 3
Product 38 Unit Type: mg
Product 21 Price/Unit (In Dollars): 34
Product 5 Price/Unit (In Dollars): 55
Product 1 Application Directions: apply thin layer to face qhs
Product 7 Application Directions: apply thin layer to face qam
Product 23 Application Directions: apply to face QAM and repeat every 2 hours when in the sun
Product 3 Amount/Unit (Numbers Only): 1
Product 11 Unit Type: jar(s)
Name Of Product 12: 10/2 Therapeutic pads
Product 23 Unit Type: bottle(s)
Product 18 Amount/Unit (Numbers Only): 2
Product 14 Puente/Unit (In Dollars): 51
Product 16 Application Directions: apply scattered drops to scalp qam
Product 7 Unit Type: tube(s)
Product 1 Unit Type: grams
Name Of Product 24: GlySal
Product 10 Price/Unit (In Dollars): 85
Name Of Product 2: proscriptix pills
Name Of Product 8: Biocorneum scar gel
Product 19 Price/Unit (In Dollars): 32
Product 12 Application Directions: wipe face qam
Name Of Product 4: Ultra Gentle Chemical Free SPF 30
Product 22 Price/Unit (In Dollars): 37.80
Product 6 Price/Unit (In Dollars): 30
Name Of Product 17: Brightening pads
Send Charges To Patient Encounter: Yes
Product 19 Application Directions: Lather onto scalp, let sit 3-5 minutes before rinsing once weekly
Name Of Product 15: AHA 10% cleanser
Product 17 Price/Unit (In Dollars): 78
Product 10 Unit Type: kit(s)
Product 4 Price/Unit (In Dollars): 45
Product 20 Price/Unit (In Dollars): 38
Product 13 Price/Unit (In Dollars): 56
Product 20 Refills: 5
Product 22 Application Directions: apply thin layer to face qday
Product 6 Application Directions: wipe face qam after washing
Name Of Product 11: retinol eye cream
Product 25 Price/Unit (In Dollars): 195
Product 15 Application Directions: Wash aa of body qday in shower
Product 3 Price/Unit (In Dollars): 91
Name Of Product 1: Nga tretinoin 0.025% topical cream
Name Of Product 23: Elta UV clear
Product 11 Application Directions: apply thin layer to periorbital region qhs
Name Of Product 7: R Essentials sunscreen SPF50
Name Of Product 16: X.Cyte stimulating hair serum
Product 18 Price/Unit (In Dollars): 12
Product 9 Application Directions: spray thin layer on back qday
Product 18 Application Directions: wash face BID\\n(BRAYDON)
Name Of Product 14: Hyaluronic acid eye serum
Product 5 Application Directions: wash face and neck qday with cleanser then apply thin layer of lotion
Product 1 Amount/Unit (Numbers Only): 20
Product 21 Application Directions: wash face BID
Name Of Product 10: Proscriptix shampoo, conditioner and pills
Product 24 Price/Unit (In Dollars): 40
Product 14 Application Directions: apply thin layer to under eye area qday
Name Of Product 19: ClariFi shampoo
Name Of Product 6: 5/2 Therapeutic pads
Product 2 Price/Unit (In Dollars): 46
Detail Level: Zone
Product 8 Price/Unit (In Dollars): 29.95
Name Of Product 22: Lite Moisture Cream
Product 10 Application Directions: Wash hair with shampoo and conditioner each wash; take 1 pill po TID
Product 2 Application Directions: take 1 tab po TID
Product 24 Application Directions: wash arms qday in shower (BRAYDON)
Name Of Product 20: Elta MD tinted
Product 15 Price/Unit (In Dollars): 39
Product 8 Application Directions: apply thin layer to scar BID
Product 17 Application Directions: wipe aa of face qday
Name Of Product 13: DensiFi Shampoo and Conditioner
Product 4 Application Directions: apply thin layer to face qam and every 2 hours during the day
Name Of Product 25: AlphaRet
Product 11 Price/Unit (In Dollars): 105
Name Of Product 9: 10/2 Therapeutic Spray
Name Of Product 3: Nga tretinoin 0.05% topical cream
Product 13 Application Directions: Use to wash hair qday
Product 7 Price/Unit (In Dollars): 36.75
Product 23 Price/Unit (In Dollars): 41
Name Of Product 18: Proscriptix balancing cleanser
Product 3 Application Directions: apply thin layer to scars QHS
Name Of Product 5: R Essentials KP Kit
Name Of Product 21: Proscriptix Vital Active Antioxidant Cleanser

## 2023-09-07 NOTE — PROCEDURE: BIOPSY BY SHAVE METHOD
Detail Level: Detailed
Depth Of Biopsy: dermis
Was A Bandage Applied: Yes
Size Of Lesion In Cm: 0
Biopsy Type: H and E
Biopsy Method: Dermablade
Anesthesia Type: 1% lidocaine with epinephrine
Anesthesia Volume In Cc (Will Not Render If 0): 0.3
Hemostasis: Electrocautery and Aluminum Chloride
Wound Care: Petrolatum
Dressing: pressure dressing
Destruction After The Procedure: No
Type Of Destruction Used: Curettage
Curettage Text: The wound bed was treated with curettage after the biopsy was performed.
Cryotherapy Text: The wound bed was treated with cryotherapy after the biopsy was performed.
Electrodesiccation Text: The wound bed was treated with electrodesiccation after the biopsy was performed.
Electrodesiccation And Curettage Text: The wound bed was treated with electrodesiccation and curettage after the biopsy was performed.
Silver Nitrate Text: The wound bed was treated with silver nitrate after the biopsy was performed.
Lab: 6
Consent: Written consent was obtained and risks were reviewed including but not limited to scarring, infection, bleeding, scabbing, incomplete removal, nerve damage and allergy to anesthesia.
Post-Care Instructions: I reviewed with the patient in detail post-care instructions. Patient is to keep the biopsy site dry overnight, and then apply bacitracin twice daily until healed. Patient may apply hydrogen peroxide soaks to remove any crusting.
Notification Instructions: Patient will be notified of biopsy results. However, patient instructed to call the office if not contacted within 2 weeks.
Billing Type: Third-Party Bill
Information: Selecting Yes will display possible errors in your note based on the variables you have selected. This validation is only offered as a suggestion for you. PLEASE NOTE THAT THE VALIDATION TEXT WILL BE REMOVED WHEN YOU FINALIZE YOUR NOTE. IF YOU WANT TO FAX A PRELIMINARY NOTE YOU WILL NEED TO TOGGLE THIS TO 'NO' IF YOU DO NOT WANT IT IN YOUR FAXED NOTE.

## 2023-09-07 NOTE — PROCEDURE: DEFER
Detail Level: Zone
Procedure To Be Performed At Next Visit: Botox (Cosmetic)
Instructions (Optional): quoted for 48 units $768 - photo in attachments
Introduction Text (Please End With A Colon): The following procedure was deferred:
X Size Of Lesion In Cm (Optional): 0

## 2023-09-07 NOTE — PROCEDURE: PRESCRIPTION MEDICATION MANAGEMENT
Detail Level: Zone
Initiate Treatment: AlphaRet: apply thin layer to face QHS
Render In Strict Bullet Format?: No

## (undated) DEVICE — COVER CAMERA LIGHT HANDLE

## (undated) DEVICE — DRESSING AQUACEL ADVA ANTIMCROBIAL 3.5 X 10IN

## (undated) DEVICE — HANDPIECE SUCTION INTERPULSE W/BONE CLEANING TIP

## (undated) DEVICE — BLADE SAGITTAL EXTRA-WIDE THIN SHORT

## (undated) DEVICE — TIP BOVIE BLADE COATED 6IN

## (undated) DEVICE — SUTURE QUILL PDO 2 RX-1066Q

## (undated) DEVICE — NEEDLE HYPODERMIC PRO 18G X 1 1/2IN

## (undated) DEVICE — PENEVAC1 NONSTICK SMOKE EVAC

## (undated) DEVICE — PACK TOTAL HIP

## (undated) DEVICE — DRAPE ANTERIOR HIP L

## (undated) DEVICE — ***USE 110101***ADHESIVE SKIN LIQUIBAND EXCEED .8GM

## (undated) DEVICE — CLOTH PREPPING SAGE 2% CHG 2/PK

## (undated) DEVICE — SOLN DURAPREP WAND

## (undated) DEVICE — LABEL MEDICATION NEUO ORTHO

## (undated) DEVICE — CONTAINER SPECIMEN 4OZ

## (undated) DEVICE — GLOVE PROTEXIS PI ORTHO 9.0

## (undated) DEVICE — GLOVE LINER PROTEXIS 9.0 PI BLUE

## (undated) DEVICE — ADHESIVE SKIN DERMABOND ADVANCED 0.7ML

## (undated) DEVICE — STOCKINETTE DBL PLY STERILE 8X60

## (undated) DEVICE — SOLUTION PROV-IODINE .75 OZ

## (undated) DEVICE — SOLN IRRIG .9%SOD 1000ML

## (undated) DEVICE — SUTURE VICRYL 1 J196H CP UNDYED 27IN

## (undated) DEVICE — SOLN IV 0.9% NSS 1000ML

## (undated) DEVICE — Device

## (undated) DEVICE — DRAPE HALF STERILE

## (undated) DEVICE — SYRINGE DISP LUER-LOK 30 CC

## (undated) DEVICE — SUTURE QUILL PDO 0 RX-2068Q 1/2 CIRCLE 36MM REVERSE CUTTER 4

## (undated) DEVICE — MANIFOLD FOUR PORT NEPTUNE

## (undated) DEVICE — SOLN BETADINE 4 OZ

## (undated) DEVICE — GLOVES PI ORTHO SZ 9 LF PF